# Patient Record
Sex: FEMALE | Race: ASIAN | Employment: FULL TIME | URBAN - METROPOLITAN AREA
[De-identification: names, ages, dates, MRNs, and addresses within clinical notes are randomized per-mention and may not be internally consistent; named-entity substitution may affect disease eponyms.]

---

## 2017-01-16 RX ORDER — TRAZODONE HYDROCHLORIDE 50 MG/1
TABLET ORAL
Qty: 90 TABLET | Refills: 0 | Status: SHIPPED | OUTPATIENT
Start: 2017-01-16 | End: 2017-05-01

## 2017-05-01 ENCOUNTER — OFFICE VISIT (OUTPATIENT)
Dept: FAMILY MEDICINE CLINIC | Facility: CLINIC | Age: 37
End: 2017-05-01

## 2017-05-01 VITALS
HEIGHT: 63 IN | RESPIRATION RATE: 16 BRPM | BODY MASS INDEX: 28 KG/M2 | WEIGHT: 158 LBS | TEMPERATURE: 98 F | SYSTOLIC BLOOD PRESSURE: 114 MMHG | HEART RATE: 74 BPM | DIASTOLIC BLOOD PRESSURE: 72 MMHG | OXYGEN SATURATION: 99 %

## 2017-05-01 DIAGNOSIS — F51.01 PRIMARY INSOMNIA: ICD-10-CM

## 2017-05-01 DIAGNOSIS — R63.5 WEIGHT GAIN: ICD-10-CM

## 2017-05-01 DIAGNOSIS — E78.1 HYPERTRIGLYCERIDEMIA: ICD-10-CM

## 2017-05-01 DIAGNOSIS — E55.9 HYPOVITAMINOSIS D: ICD-10-CM

## 2017-05-01 DIAGNOSIS — M54.12 CERVICAL RADICULOPATHY AT C6: ICD-10-CM

## 2017-05-01 DIAGNOSIS — Z00.00 ROUTINE GENERAL MEDICAL EXAMINATION AT A HEALTH CARE FACILITY: Primary | ICD-10-CM

## 2017-05-01 DIAGNOSIS — R14.0 ABDOMINAL BLOATING: ICD-10-CM

## 2017-05-01 DIAGNOSIS — Z00.00 BLOOD TESTS FOR ROUTINE GENERAL PHYSICAL EXAMINATION: ICD-10-CM

## 2017-05-01 PROCEDURE — 99395 PREV VISIT EST AGE 18-39: CPT | Performed by: FAMILY MEDICINE

## 2017-05-01 RX ORDER — TRAZODONE HYDROCHLORIDE 50 MG/1
TABLET ORAL
Qty: 90 TABLET | Refills: 3 | Status: SHIPPED | OUTPATIENT
Start: 2017-05-01 | End: 2018-04-28

## 2017-05-01 NOTE — PROGRESS NOTES
HPI:  Here for a physical.    PAST MEDICAL HISTORY:  Past Medical History   Diagnosis Date   • Insomnia      PAST SURGICAL HISTORY:      Past Surgical History    INJECTION, W/WO CONTRAST, DX/THERAPEUTIC SUBSTANCE, EPIDURAL/SUBARACHNOID; CERVICAL/THORACIC N Resumed it. Noted that she was not sleeping as well when she lowered the dose. Now sleeping better again. EYES: No complaints ofdiplopia or blurred vision. EARS: No complaints of tinnitus or decreased hearing acuity.   CARDIOVASCULAR: No complaints of c EOMI, bilateral sclera anicteric, non-injected. Conjunctiva pink. NOSE: Mucosa appears healthy. OROPHARYNX: Mucosa moist without lesions. Dentition intact and gums appear healthy. NECK: Supple, No lymphadenopathy. No thyromegaly or lesions palpated.   Tay Bhatti recommended. Tetanus, diphtheria, pertussis vaccine: send me her records from immigration. She may be due for a Tdap as she applied her green card in 2006.         Patient understood above plan and agreed to do labs within the next 30 days as well as to m

## 2017-05-01 NOTE — PATIENT INSTRUCTIONS
Fast 8 hours for labs. Water, black coffee, or plain tea only. Any sugar in the system will alter the glucose level and triglyceride level. Send vaccine records to me via my chart or mail or drop them off.   Tetanus vaccine should be done once every 10

## 2017-05-02 RX ORDER — TRAZODONE HYDROCHLORIDE 50 MG/1
TABLET ORAL
Qty: 90 TABLET | Refills: 0 | OUTPATIENT
Start: 2017-05-02

## 2017-05-05 ENCOUNTER — LAB ENCOUNTER (OUTPATIENT)
Dept: LAB | Age: 37
End: 2017-05-05
Attending: FAMILY MEDICINE
Payer: COMMERCIAL

## 2017-05-05 DIAGNOSIS — R14.0 ABDOMINAL BLOATING: ICD-10-CM

## 2017-05-05 DIAGNOSIS — Z00.00 BLOOD TESTS FOR ROUTINE GENERAL PHYSICAL EXAMINATION: ICD-10-CM

## 2017-05-05 DIAGNOSIS — E78.1 HYPERTRIGLYCERIDEMIA: ICD-10-CM

## 2017-05-05 DIAGNOSIS — R63.5 WEIGHT GAIN: ICD-10-CM

## 2017-05-05 DIAGNOSIS — E55.9 HYPOVITAMINOSIS D: ICD-10-CM

## 2017-05-05 PROCEDURE — 80061 LIPID PANEL: CPT

## 2017-05-05 PROCEDURE — 36415 COLL VENOUS BLD VENIPUNCTURE: CPT

## 2017-05-05 PROCEDURE — 80053 COMPREHEN METABOLIC PANEL: CPT

## 2017-05-05 PROCEDURE — 84443 ASSAY THYROID STIM HORMONE: CPT

## 2017-05-05 PROCEDURE — 83516 IMMUNOASSAY NONANTIBODY: CPT

## 2017-05-05 PROCEDURE — 82306 VITAMIN D 25 HYDROXY: CPT

## 2017-05-05 PROCEDURE — 83036 HEMOGLOBIN GLYCOSYLATED A1C: CPT

## 2017-05-05 PROCEDURE — 85025 COMPLETE CBC W/AUTO DIFF WBC: CPT

## 2017-05-08 ENCOUNTER — TELEPHONE (OUTPATIENT)
Dept: FAMILY MEDICINE CLINIC | Facility: CLINIC | Age: 37
End: 2017-05-08

## 2017-05-08 DIAGNOSIS — R14.0 BLOATING: Primary | ICD-10-CM

## 2017-05-08 DIAGNOSIS — R63.5 WEIGHT GAIN: ICD-10-CM

## 2017-05-08 DIAGNOSIS — E78.1 HYPERTRIGLYCERIDEMIA: ICD-10-CM

## 2017-05-08 DIAGNOSIS — E55.9 VITAMIN D DEFICIENCY: Primary | ICD-10-CM

## 2017-05-08 RX ORDER — FENOFIBRATE 54 MG/1
54 TABLET ORAL DAILY
Qty: 30 TABLET | Refills: 11 | Status: SHIPPED | OUTPATIENT
Start: 2017-05-08 | End: 2017-06-23 | Stop reason: ALTCHOICE

## 2017-05-08 RX ORDER — ERGOCALCIFEROL 1.25 MG/1
50000 CAPSULE ORAL
Qty: 4 CAPSULE | Refills: 3 | Status: SHIPPED | OUTPATIENT
Start: 2017-05-08 | End: 2017-06-23 | Stop reason: SINTOL

## 2017-05-08 NOTE — TELEPHONE ENCOUNTER
Spoke to pt to inform her ok to see GI at this time for bloating/wt gain. Referral for Dr. Kim Parkinson placed.

## 2017-05-16 ENCOUNTER — OFFICE VISIT (OUTPATIENT)
Dept: PHYSICAL THERAPY | Age: 37
End: 2017-05-16
Attending: FAMILY MEDICINE
Payer: COMMERCIAL

## 2017-05-16 ENCOUNTER — APPOINTMENT (OUTPATIENT)
Dept: PHYSICAL THERAPY | Age: 37
End: 2017-05-16
Attending: FAMILY MEDICINE
Payer: COMMERCIAL

## 2017-05-16 DIAGNOSIS — M54.12 CERVICAL RADICULOPATHY AT C6: Primary | ICD-10-CM

## 2017-05-16 PROCEDURE — 97162 PT EVAL MOD COMPLEX 30 MIN: CPT | Performed by: PHYSICAL THERAPIST

## 2017-05-16 NOTE — PROGRESS NOTES
SPINE EVALUATION:   Referring Physician: Dr. Nessa Vega  Diagnosis: Cervical radiculopathy at C6 (M54.12)     Date of Service: 5/16/2017     PATIENT SUMMARY   [place]an [place] is a 42year old y/o female who presents to therapy today with complaints of neck, decreased cervical facet accessory motion, impaired posture. These impairments are leading to functional limitations including; listed above. Urszula would benefit from skilled Physical Therapy to address the above impairments to restore PLOF.      P will report decreased frequency of headaches to <2x/week (8 visits)  · Pt will demonstrate improved cervical intrinsic strength to 5/5 to allow improved cervical stabilization with ADL such as overhead reaching (8 visits)  · Pt will improve postural streng

## 2017-05-17 ENCOUNTER — OFFICE VISIT (OUTPATIENT)
Dept: PHYSICAL THERAPY | Age: 37
End: 2017-05-17
Attending: FAMILY MEDICINE
Payer: COMMERCIAL

## 2017-05-17 PROCEDURE — 97140 MANUAL THERAPY 1/> REGIONS: CPT | Performed by: PHYSICAL THERAPIST

## 2017-05-17 PROCEDURE — 97110 THERAPEUTIC EXERCISES: CPT | Performed by: PHYSICAL THERAPIST

## 2017-05-17 NOTE — PROGRESS NOTES
Dx: Cervical radiculopathy at C6 (M54.12)             Authorized # of Visits:  8         Next MD visit: none scheduled  Fall Risk: standard         Precautions: n/a             Subjective: Patient returns for second visit and is reporting improvement in he pain with lifting to put box on shelf (8 visits)  · Pt will be independent and compliant with comprehensive HEP to maintain progress achieved in PT (8 visits)    Plan: Monitor response to updated HEP. Continue mobility progression.  Utilize manipulation as

## 2017-05-23 ENCOUNTER — OFFICE VISIT (OUTPATIENT)
Dept: PHYSICAL THERAPY | Age: 37
End: 2017-05-23
Attending: FAMILY MEDICINE
Payer: COMMERCIAL

## 2017-05-23 PROCEDURE — 97110 THERAPEUTIC EXERCISES: CPT | Performed by: PHYSICAL THERAPIST

## 2017-05-23 PROCEDURE — 97140 MANUAL THERAPY 1/> REGIONS: CPT | Performed by: PHYSICAL THERAPIST

## 2017-05-23 NOTE — PROGRESS NOTES
Dx: Cervical radiculopathy at C6 (M54.12)             Authorized # of Visits:  8         Next MD visit: none scheduled  Fall Risk: standard         Precautions: n/a             Subjective: Patient is reporting improved symptoms today.  Less burning pain thr decreased pain with lifting to put box on shelf (8 visits)  · Pt will be independent and compliant with comprehensive HEP to maintain progress achieved in PT (8 visits)    Plan: Update HEP at next session if no setbacks today.  Continue focus on flexibility

## 2017-05-24 ENCOUNTER — APPOINTMENT (OUTPATIENT)
Dept: PHYSICAL THERAPY | Age: 37
End: 2017-05-24
Payer: COMMERCIAL

## 2017-05-26 ENCOUNTER — APPOINTMENT (OUTPATIENT)
Dept: PHYSICAL THERAPY | Age: 37
End: 2017-05-26
Attending: FAMILY MEDICINE
Payer: COMMERCIAL

## 2017-05-31 ENCOUNTER — APPOINTMENT (OUTPATIENT)
Dept: LAB | Age: 37
End: 2017-05-31
Attending: INTERNAL MEDICINE
Payer: COMMERCIAL

## 2017-05-31 DIAGNOSIS — R14.0 ABDOMINAL DISTENTION: ICD-10-CM

## 2017-05-31 PROCEDURE — 83013 H PYLORI (C-13) BREATH: CPT

## 2017-06-05 ENCOUNTER — APPOINTMENT (OUTPATIENT)
Dept: PHYSICAL THERAPY | Age: 37
End: 2017-06-05
Attending: FAMILY MEDICINE
Payer: COMMERCIAL

## 2017-06-06 ENCOUNTER — OFFICE VISIT (OUTPATIENT)
Dept: PHYSICAL THERAPY | Age: 37
End: 2017-06-06
Attending: FAMILY MEDICINE
Payer: COMMERCIAL

## 2017-06-06 PROCEDURE — 97140 MANUAL THERAPY 1/> REGIONS: CPT | Performed by: PHYSICAL THERAPIST

## 2017-06-06 PROCEDURE — 97110 THERAPEUTIC EXERCISES: CPT | Performed by: PHYSICAL THERAPIST

## 2017-06-06 NOTE — PROGRESS NOTES
Dx: Cervical radiculopathy at C6 (M54.12)             Authorized # of Visits:  8         Next MD visit: none scheduled  Fall Risk: standard         Precautions: n/a             Subjective: Patient reports no neck pain anymore.  The burning sensation has diana maintain progress achieved in PT (8 visits)    Plan: Monitor response to progression of exercises. Continue as tolerated. Date: 5/17/2017  Tx#: 2/8 Date: 5/23   Tx#: 3/8  Date: 6/6  Tx#: 4/8 Date: Tx#: 5/ Date: Tx#: 6/ Date: Tx#: 7/ Date:    Tx#:

## 2017-06-07 ENCOUNTER — TELEPHONE (OUTPATIENT)
Dept: FAMILY MEDICINE CLINIC | Facility: CLINIC | Age: 37
End: 2017-06-07

## 2017-06-07 DIAGNOSIS — E78.1 HYPERTRIGLYCERIDEMIA: Primary | ICD-10-CM

## 2017-06-07 NOTE — TELEPHONE ENCOUNTER
Patient has been informed of physician's response. She verbalizes understanding and will have repeat lipids in 2 months   Task is done.

## 2017-06-07 NOTE — TELEPHONE ENCOUNTER
Spoke to patient, has been having severe dry mouth, metallic taste, and total lack of taste to foods - feels like eating wood chips when tries to eat anything and is now averse to eating because it is such an awful experience.   Sees GI doctor who did an H.

## 2017-06-08 ENCOUNTER — OFFICE VISIT (OUTPATIENT)
Dept: PHYSICAL THERAPY | Age: 37
End: 2017-06-08
Attending: FAMILY MEDICINE
Payer: COMMERCIAL

## 2017-06-08 ENCOUNTER — APPOINTMENT (OUTPATIENT)
Dept: LAB | Age: 37
End: 2017-06-08
Attending: INTERNAL MEDICINE
Payer: COMMERCIAL

## 2017-06-08 DIAGNOSIS — R43.2 DYSGEUSIA: ICD-10-CM

## 2017-06-08 PROCEDURE — 97110 THERAPEUTIC EXERCISES: CPT | Performed by: PHYSICAL THERAPIST

## 2017-06-08 PROCEDURE — 86235 NUCLEAR ANTIGEN ANTIBODY: CPT

## 2017-06-08 PROCEDURE — 83655 ASSAY OF LEAD: CPT

## 2017-06-08 PROCEDURE — 97140 MANUAL THERAPY 1/> REGIONS: CPT | Performed by: PHYSICAL THERAPIST

## 2017-06-08 PROCEDURE — 84630 ASSAY OF ZINC: CPT

## 2017-06-08 PROCEDURE — 36415 COLL VENOUS BLD VENIPUNCTURE: CPT

## 2017-06-08 PROCEDURE — 82525 ASSAY OF COPPER: CPT

## 2017-06-08 NOTE — PROGRESS NOTES
Dx: Cervical radiculopathy at C6 (M54.12)             Authorized # of Visits:  8         Next MD visit: none scheduled  Fall Risk: standard         Precautions: n/a             Subjective: Patient reports no pain at this time.  She is very pleased with her Date:   Tx#: 7/ Date:    Tx#: 8/   UBE for x 4 min  X 5 L 1.5  X 5 L 1.5  X 5 L 2      Upslope mobilization right facets 3x15 sec Upslope mobilization right facets 3x15 sec CTJ lateral glide mobilization right to left Gr III-IV 3x30 sec  CTJ lateral glide m

## 2017-06-11 ENCOUNTER — PATIENT MESSAGE (OUTPATIENT)
Dept: FAMILY MEDICINE CLINIC | Facility: CLINIC | Age: 37
End: 2017-06-11

## 2017-06-13 ENCOUNTER — OFFICE VISIT (OUTPATIENT)
Dept: PHYSICAL THERAPY | Age: 37
End: 2017-06-13
Attending: FAMILY MEDICINE
Payer: COMMERCIAL

## 2017-06-13 PROCEDURE — 97110 THERAPEUTIC EXERCISES: CPT | Performed by: PHYSICAL THERAPIST

## 2017-06-13 PROCEDURE — 97140 MANUAL THERAPY 1/> REGIONS: CPT | Performed by: PHYSICAL THERAPIST

## 2017-06-13 NOTE — PROGRESS NOTES
Dx: Cervical radiculopathy at C6 (M54.12)             Authorized # of Visits:  8         Next MD visit: none scheduled  Fall Risk: standard         Precautions: n/a             Subjective: Patient reports she was doing well up until Sunday night.  She was t decreased pain with looking up and reaching overhead (8 visits)- met  · Pt will report decreased frequency of headaches to <2x/week (8 visits)- met  · Pt will demonstrate improved cervical intrinsic strength to 5/5 to allow improved cervical stabilization x15     - - TRX W step in x 10 x15 x15     FR OH shldr flex with chin tuck 5 sec hold x 15 x15  x20 x20 3 lb ball -     - Standing W scap retraction 2x10  RTB Standing horizontal abd 2x15 GTB 2x15 GTB Sport core mini-squat hor abd 2x10     - Supine chin tu

## 2017-06-15 ENCOUNTER — OFFICE VISIT (OUTPATIENT)
Dept: PHYSICAL THERAPY | Age: 37
End: 2017-06-15
Attending: FAMILY MEDICINE
Payer: COMMERCIAL

## 2017-06-15 PROCEDURE — 97140 MANUAL THERAPY 1/> REGIONS: CPT | Performed by: PHYSICAL THERAPIST

## 2017-06-15 PROCEDURE — 97110 THERAPEUTIC EXERCISES: CPT | Performed by: PHYSICAL THERAPIST

## 2017-06-15 NOTE — PROGRESS NOTES
Dx: Cervical radiculopathy at C6 (M54.12)             Authorized # of Visits:  8         Next MD visit: none scheduled  Fall Risk: standard         Precautions: n/a             Subjective: Patient returns today reporting less shoulder discomfort but she is additional 1-2 visits with anticipation of discharge. Date: 5/17/2017  Tx#: 2/8 Date: 5/23   Tx#: 3/8  Date: 6/6  Tx#: 4/8 Date: 6/8   Tx#: 5/ Date: 6/13  Tx#: 6/ Date: 6/15  Tx#: 7/ Date:    Tx#: 8/   UBE for x 4 min  X 5 L 1.5  X 5 L 1.5  X 5 L 2 3 mi each - Doorway pec stretch 2x30 sec  Doorway pec stretch 2x30 sec     Seated CTJ TJM x 1 CTJ TJM X 1   Mid thoracic seat TJM x 1 - - Wall pec/biceps stretch 3x15 sec  Wall pec/biceps stretch 3x15 sec    HEP review  Wall push-ups 2x10 2x10  2x15 2x15    Ski

## 2017-06-21 ENCOUNTER — OFFICE VISIT (OUTPATIENT)
Dept: PHYSICAL THERAPY | Age: 37
End: 2017-06-21
Attending: FAMILY MEDICINE
Payer: COMMERCIAL

## 2017-06-21 PROCEDURE — 97140 MANUAL THERAPY 1/> REGIONS: CPT | Performed by: PHYSICAL THERAPIST

## 2017-06-21 PROCEDURE — 97110 THERAPEUTIC EXERCISES: CPT | Performed by: PHYSICAL THERAPIST

## 2017-06-21 NOTE — PROGRESS NOTES
Progress Summary    Pt has attended 8, cancelled 0, and no shown 0 visits in Physical Therapy. Dx: Cervical radiculopathy at C6 (M54.12)             Subjective:  Patient reports she has made gains at this time since beginning therapy.  She states early o Special tests:   Alar Ligament Testing: R -, L -  Flexion-Rotation Test: -  ULTT: negative all three  Cervical Spine Quadrant Testing: + on right  Spurling's Test: -    Goals:  Progressing  ·   · Pt will improve cervical AROM extension  by 15 degrees t and has agreed to actively participate in planning and for this course of care. Thank you for your referral. If you have any questions, please contact me at Dept: [phone].     Sincerely,  Electronically signed by therapist: Meaghan Mancilla PT FR OH shldr flex with chin tuck 5 sec hold x 15 x15  x20 x20 3 lb ball - - FR OH shldr flex with chin tuck 5 sec hold x 15   - Standing W scap retraction 2x10  RTB Standing horizontal abd 2x15 GTB 2x15 GTB Sport core mini-squat hor abd 2x10 Sport core mi

## 2017-06-23 ENCOUNTER — APPOINTMENT (OUTPATIENT)
Dept: LAB | Age: 37
End: 2017-06-23
Attending: FAMILY MEDICINE
Payer: COMMERCIAL

## 2017-06-23 ENCOUNTER — OFFICE VISIT (OUTPATIENT)
Dept: FAMILY MEDICINE CLINIC | Facility: CLINIC | Age: 37
End: 2017-06-23

## 2017-06-23 VITALS
BODY MASS INDEX: 27 KG/M2 | TEMPERATURE: 98 F | DIASTOLIC BLOOD PRESSURE: 80 MMHG | WEIGHT: 152 LBS | RESPIRATION RATE: 20 BRPM | SYSTOLIC BLOOD PRESSURE: 120 MMHG | OXYGEN SATURATION: 98 % | HEART RATE: 90 BPM

## 2017-06-23 DIAGNOSIS — K11.7 XEROSTOMIA: Primary | ICD-10-CM

## 2017-06-23 DIAGNOSIS — N92.6 IRREGULAR MENSES: ICD-10-CM

## 2017-06-23 DIAGNOSIS — E55.9 HYPOVITAMINOSIS D: ICD-10-CM

## 2017-06-23 DIAGNOSIS — R43.8: ICD-10-CM

## 2017-06-23 DIAGNOSIS — R14.0 ABDOMINAL BLOATING: ICD-10-CM

## 2017-06-23 DIAGNOSIS — E78.1 HYPERTRIGLYCERIDEMIA: ICD-10-CM

## 2017-06-23 PROCEDURE — 99214 OFFICE O/P EST MOD 30 MIN: CPT | Performed by: FAMILY MEDICINE

## 2017-06-23 NOTE — PROGRESS NOTES
Saw her in May for a physical.  Triglycerides are found to be elevated and vitamin D level was 10. She was placed on fenofibrate for the triglycerides and vitamin D 50,000 international units weekly.   She is developed a dry mouth with decreased taste sens N/A 5/2/2016    Comment Procedure: CERVICAL EPIDURAL;  Surgeon: Letitia Vazquez MD;  Location: [facility]     MEDICATIONS:    Current Outpatient Prescriptions:  TraZODone HCl 50 MG Oral Tab TAKE 1 TABLET BY MOUTH AT BEDTIME Disp: 90 tab

## 2017-06-25 ENCOUNTER — TELEPHONE (OUTPATIENT)
Dept: FAMILY MEDICINE CLINIC | Facility: CLINIC | Age: 37
End: 2017-06-25

## 2017-06-25 DIAGNOSIS — N92.1 MENORRHAGIA WITH IRREGULAR CYCLE: Primary | ICD-10-CM

## 2017-06-27 ENCOUNTER — HOSPITAL ENCOUNTER (OUTPATIENT)
Dept: ULTRASOUND IMAGING | Age: 37
Discharge: HOME | End: 2017-06-27
Attending: FAMILY MEDICINE
Payer: COMMERCIAL

## 2017-06-27 DIAGNOSIS — N92.6 IRREGULAR MENSES: ICD-10-CM

## 2017-06-27 PROCEDURE — 76856 US EXAM PELVIC COMPLETE: CPT | Performed by: FAMILY MEDICINE

## 2017-06-27 PROCEDURE — 76830 TRANSVAGINAL US NON-OB: CPT | Performed by: FAMILY MEDICINE

## 2017-06-29 ENCOUNTER — OFFICE VISIT (OUTPATIENT)
Dept: PHYSICAL THERAPY | Age: 37
End: 2017-06-29
Attending: FAMILY MEDICINE
Payer: COMMERCIAL

## 2017-06-29 PROCEDURE — 97110 THERAPEUTIC EXERCISES: CPT | Performed by: PHYSICAL THERAPIST

## 2017-06-29 PROCEDURE — 97112 NEUROMUSCULAR REEDUCATION: CPT | Performed by: PHYSICAL THERAPIST

## 2017-06-29 PROCEDURE — 97140 MANUAL THERAPY 1/> REGIONS: CPT | Performed by: PHYSICAL THERAPIST

## 2017-06-29 NOTE — PROGRESS NOTES
Dx: Cervical radiculopathy at C6 (M54.12)             Subjective:  Patient returns reporting ongoing radicular symptoms in to her hand. She states they are through the 3rd and 4th digits and are constant and changing.  She has continued to focus on her po Continue at 1x a week at this time. Progress as tolerated.      Date: 5/17/2017  Tx#: 2/8 Date: 5/23   Tx#: 3/8  Date: 6/6  Tx#: 4/8 Date: 6/8   Tx#: 5/ Date: 6/13  Tx#: 6/ Date: 6/15  Tx#: 7/ Date: 6/21  Tx#: 8/ Date: 6/29  Tx#: 9/   UBE for x 4 min  X 5 L stretch 2x30 sec    FR OH shldr flex with chin tuck 5 sec hold x 15 x15  x20 x20 3 lb ball - - FR OH shldr flex with chin tuck 5 sec hold x 15 Posture education   - Standing W scap retraction 2x10  RTB Standing horizontal abd 2x15 GTB 2x15 GTB Sport core m

## 2017-06-30 ENCOUNTER — NURSE ONLY (OUTPATIENT)
Dept: ELECTROPHYSIOLOGY | Facility: HOSPITAL | Age: 37
End: 2017-06-30
Attending: FAMILY MEDICINE
Payer: COMMERCIAL

## 2017-06-30 DIAGNOSIS — R43.8: ICD-10-CM

## 2017-06-30 PROCEDURE — 95819 EEG AWAKE AND ASLEEP: CPT | Performed by: OTHER

## 2017-07-03 NOTE — PROCEDURES
ELECTROENCEPHALOGRAM REPORT      Patient Name: Ruben Griffith  Chart ID: [id number]  Ordering Physician:   Dr Kirby Sevilla                   Date of Test: 6/30/2017  Patient Diagnosis: Primary sweet taste disorder    DX EVALUATION FOR SEIZURES. 42YEAR OLD FEMAL

## 2017-07-05 ENCOUNTER — OFFICE VISIT (OUTPATIENT)
Dept: PHYSICAL THERAPY | Age: 37
End: 2017-07-05
Attending: FAMILY MEDICINE
Payer: COMMERCIAL

## 2017-07-05 PROCEDURE — 97110 THERAPEUTIC EXERCISES: CPT | Performed by: PHYSICAL THERAPIST

## 2017-07-05 PROCEDURE — 97140 MANUAL THERAPY 1/> REGIONS: CPT | Performed by: PHYSICAL THERAPIST

## 2017-07-06 ENCOUNTER — APPOINTMENT (OUTPATIENT)
Dept: PHYSICAL THERAPY | Age: 37
End: 2017-07-06
Payer: COMMERCIAL

## 2017-07-06 NOTE — PROGRESS NOTES
Dx: Cervical radiculopathy at C6 (M54.12)             Subjective:  Patient reports she has been more focused on not tensing up and assuming relaxed posture. Her symptoms have significantly decreased.  She no longer has radicular hand symptoms and her neck in PT (8 visits)- progressing        Plan: Discharge to independent [facility] at next visit if no setbacks.      Date: 5/17/2017  Tx#: 2/8 Date: 5/23   Tx#: 3/8  Date: 6/6  Tx#: 4/8 Date: 6/8   Tx#: 5/ Date: 6/13  Tx#: 6/ Date: 6/15  Tx#: 7/ Date: 6/21  Tx#: 8/ D Red sport cord 2x20  shldr ext    - - TRX W pec stretch 5x10 sec  10 x 5 sec  x15 x15 - Doorway pec stretch 3x15 sec  2x30 sec    - - TRX W step in x 10 x15 x15 x15 - Child's pose stretch 2x30 sec  2x30 sec    FR OH shldr flex with chin tuck 5 sec hold x 1

## 2017-07-10 ENCOUNTER — OFFICE VISIT (OUTPATIENT)
Dept: PHYSICAL THERAPY | Age: 37
End: 2017-07-10
Attending: FAMILY MEDICINE
Payer: COMMERCIAL

## 2017-07-10 PROCEDURE — 97110 THERAPEUTIC EXERCISES: CPT | Performed by: PHYSICAL THERAPIST

## 2017-07-10 PROCEDURE — 97140 MANUAL THERAPY 1/> REGIONS: CPT | Performed by: PHYSICAL THERAPIST

## 2017-07-10 NOTE — PROGRESS NOTES
Dx: Cervical radiculopathy at C6 (M54.12)             Subjective:  Patient reports that her low back pain is much better. She reports her radicular pain was not bad until today when she was at work.  Her symptoms are located in the lateral arm and in to t posturing and decreased pain with looking up and reaching overhead (8 visits)- met  · Pt will report decreased frequency of headaches to <2x/week (8 visits)- met  · Pt will demonstrate improved cervical intrinsic strength to 5/5 to allow improved cervical min X 5 min X 8 min STM to paraspinals   Trigger point release x 90 sec 2x90 sec mid trap/rhomboid right x90 sec mid trap/rhomboid right Sidelying STM to right UT/LS/ midtrap   x 6 min  IASTM to supraspinatus x 4 min    STM x 4 min to pectoralis  Suboccipi thoracic x 1 - - Thai ball Ts and Ys x 20 each with cervical retraction   HEP review  Wall push-ups 2x10 2x10  2x15 2x15 Wall push-ups 2x10 x20 x20  x20       Charges: M x 1, TE x 2 Total Timed Treatment: 38 min     Total Treatment Time: 38 min

## 2017-07-11 ENCOUNTER — APPOINTMENT (OUTPATIENT)
Dept: PHYSICAL THERAPY | Age: 37
End: 2017-07-11
Payer: COMMERCIAL

## 2017-07-12 ENCOUNTER — PATIENT MESSAGE (OUTPATIENT)
Dept: FAMILY MEDICINE CLINIC | Facility: CLINIC | Age: 37
End: 2017-07-12

## 2017-07-12 NOTE — TELEPHONE ENCOUNTER
From: Orlando Boyle  To: Meño De Leon MD  Sent: 7/12/2017 7:22 AM CDT  Subject: Test Results Question    Dear Dr. Jf Bryant had mentioned that you could consider prescribing 3 months of birth control pills in the Ultrasound tests results.  Please

## 2017-07-12 NOTE — TELEPHONE ENCOUNTER
Please let patient know Dr Amie Jacobsen is out of the office until Monday. I do not see a birth control pill mentioned in his office note. Also do not yet see the results of the EEG.

## 2017-07-12 NOTE — TELEPHONE ENCOUNTER
Dr. Flor Francis, please advise on birth control you want sent, and review EEG under 6/10/17 nurse visit in chart review. Thanks!

## 2017-07-13 NOTE — TELEPHONE ENCOUNTER
Reviewed last visit and ultrasound results/comments. Ok to start Junel FE 24 to start on the first day of her next menses. 1 pack, 2 refills. Follow up appt with RC in 3 months. I see the EEG req under procedures, but I don't see the results yet.

## 2017-07-14 RX ORDER — NORETHINDRONE ACETATE AND ETHINYL ESTRADIOL AND FERROUS FUMARATE 1MG-20(24)
1 KIT ORAL DAILY
Qty: 28 TABLET | Refills: 2 | Status: SHIPPED | OUTPATIENT
Start: 2017-07-14 | End: 2017-10-13

## 2017-07-14 NOTE — TELEPHONE ENCOUNTER
Regarding: RE: Test Results Question  Contact: [phone]  ----- Message from Lorena KARELY burch sent at 7/13/2017  7:03 PM CDT -----       ----- Message from Valentin Riley to Jabier Love MD sent at 7/12/2017  2:11 PM -----   Thank you, Compa Keller

## 2017-07-17 ENCOUNTER — TELEPHONE (OUTPATIENT)
Dept: PHYSICAL THERAPY | Age: 37
End: 2017-07-17

## 2017-07-25 ENCOUNTER — APPOINTMENT (OUTPATIENT)
Dept: PHYSICAL THERAPY | Age: 37
End: 2017-07-25
Attending: FAMILY MEDICINE
Payer: COMMERCIAL

## 2017-08-14 ENCOUNTER — OFFICE VISIT (OUTPATIENT)
Dept: PHYSICAL THERAPY | Age: 37
End: 2017-08-14
Attending: FAMILY MEDICINE
Payer: COMMERCIAL

## 2017-08-14 PROCEDURE — 97140 MANUAL THERAPY 1/> REGIONS: CPT | Performed by: PHYSICAL THERAPIST

## 2017-08-14 PROCEDURE — 97110 THERAPEUTIC EXERCISES: CPT | Performed by: PHYSICAL THERAPIST

## 2017-08-14 NOTE — PROGRESS NOTES
Discharge Summary    Pt has attended 12, cancelled 2, and no shown 0 visits in Physical Therapy. Dx: Cervical radiculopathy at C6 (M54.12)             Subjective: Patient returns to therapy following 1 month hiatus.  She has been attempting self-manageme as promote upright posturing and decreased pain with looking up and reaching overhead (8 visits)- met  · Pt will report decreased frequency of headaches to <2x/week (8 visits)- met  · Pt will demonstrate improved cervical intrinsic strength to 5/5 to allow down 2x15 BTBB Red sport cord 2x20  TRX rows 2x15 3x10 -    FR pec stretch 3x30 sec - Red sport cord 2x20  shldr ext  2x20 -   x15 - Doorway pec stretch 3x15 sec  2x30 sec  2x30 sec  2x30 sec   x15 - Child's pose stretch 2x30 sec  2x30 sec  - -   - FR OH s

## 2017-08-15 ENCOUNTER — OFFICE VISIT (OUTPATIENT)
Dept: FAMILY MEDICINE CLINIC | Facility: CLINIC | Age: 37
End: 2017-08-15

## 2017-08-15 VITALS
TEMPERATURE: 99 F | WEIGHT: 152 LBS | BODY MASS INDEX: 26.93 KG/M2 | RESPIRATION RATE: 18 BRPM | SYSTOLIC BLOOD PRESSURE: 120 MMHG | HEART RATE: 90 BPM | HEIGHT: 63 IN | OXYGEN SATURATION: 97 % | DIASTOLIC BLOOD PRESSURE: 78 MMHG

## 2017-08-15 DIAGNOSIS — M54.50 ACUTE MIDLINE LOW BACK PAIN WITHOUT SCIATICA: Primary | ICD-10-CM

## 2017-08-15 DIAGNOSIS — M72.2 PLANTAR FASCIITIS: ICD-10-CM

## 2017-08-15 PROCEDURE — 99213 OFFICE O/P EST LOW 20 MIN: CPT | Performed by: FAMILY MEDICINE

## 2017-08-15 RX ORDER — NORETHINDRONE ACETATE AND ETHINYL ESTRADIOL, AND FERROUS FUMARATE 1MG-20(24)
KIT ORAL
COMMUNITY
End: 2018-01-05 | Stop reason: ALTCHOICE

## 2017-08-15 NOTE — PATIENT INSTRUCTIONS
Plantar Fasciitis  Plantar fasciitis is a painful swelling of the plantar fascia. The plantar fascia is a thick, fibrous layer of tissue. It covers the bones on the bottom of your foot. And it supports the foot bones in an arched position.   This can happ · First thing in the morning and before sports, stretch the bottom of your feet. Gently flex your ankle so the toes move toward your knee. · Icing may help control heel pain. Apply an ice pack to the heel for 10-20 minutes as a preventive.  Or ice your tosha

## 2017-08-19 NOTE — PROGRESS NOTES
HPI:    Patient ID: Elo Manuel is a 42year old female. Chronic very mild plantar foot pain. Worse when taking first step. Back Pain   This is a new problem. The current episode started in the past 7 days. The problem occurs constantly.  The pr exhibits normal muscle tone. Skin: She is not diaphoretic. Vitals reviewed. ASSESSMENT/PLAN:   Acute midline low back pain without sciatica  (primary encounter diagnosis)  Plantar fasciitis    1.  Acute midline low back pain without sciatica

## 2017-10-13 RX ORDER — NORETHINDRONE ACETATE AND ETHINYL ESTRADIOL 1MG-20(24)
1 KIT ORAL DAILY
Qty: 28 TABLET | Refills: 0 | Status: SHIPPED | OUTPATIENT
Start: 2017-10-13 | End: 2017-11-10

## 2017-12-28 ENCOUNTER — APPOINTMENT (OUTPATIENT)
Dept: LAB | Age: 37
End: 2017-12-28
Attending: FAMILY MEDICINE
Payer: COMMERCIAL

## 2017-12-28 DIAGNOSIS — E78.1 HYPERTRIGLYCERIDEMIA: ICD-10-CM

## 2017-12-28 DIAGNOSIS — E55.9 VITAMIN D DEFICIENCY: ICD-10-CM

## 2017-12-28 LAB
25-HYDROXYVITAMIN D (TOTAL): 21.7 NG/ML (ref 30–100)
CHOLEST SMN-MCNC: 205 MG/DL (ref ?–200)
HDLC SERPL-MCNC: 37 MG/DL (ref 45–?)
HDLC SERPL: 5.54 {RATIO} (ref ?–4.44)
LDLC SERPL CALC-MCNC: 131 MG/DL (ref ?–130)
NONHDLC SERPL-MCNC: 168 MG/DL (ref ?–130)
TRIGL SERPL-MCNC: 187 MG/DL (ref ?–150)
VLDLC SERPL CALC-MCNC: 37 MG/DL (ref 5–40)

## 2017-12-28 PROCEDURE — 36415 COLL VENOUS BLD VENIPUNCTURE: CPT

## 2017-12-28 PROCEDURE — 80061 LIPID PANEL: CPT

## 2017-12-28 PROCEDURE — 82306 VITAMIN D 25 HYDROXY: CPT

## 2018-01-05 ENCOUNTER — OFFICE VISIT (OUTPATIENT)
Dept: FAMILY MEDICINE CLINIC | Facility: CLINIC | Age: 38
End: 2018-01-05

## 2018-01-05 VITALS
HEIGHT: 63 IN | SYSTOLIC BLOOD PRESSURE: 118 MMHG | DIASTOLIC BLOOD PRESSURE: 74 MMHG | TEMPERATURE: 98 F | OXYGEN SATURATION: 98 % | WEIGHT: 157 LBS | RESPIRATION RATE: 16 BRPM | BODY MASS INDEX: 27.82 KG/M2 | HEART RATE: 81 BPM

## 2018-01-05 DIAGNOSIS — E55.9 HYPOVITAMINOSIS D: Primary | ICD-10-CM

## 2018-01-05 PROCEDURE — 99213 OFFICE O/P EST LOW 20 MIN: CPT | Performed by: FAMILY MEDICINE

## 2018-01-05 RX ORDER — NORETHINDRONE ACETATE AND ETHINYL ESTRADIOL, AND FERROUS FUMARATE 1MG-20(24)
1 KIT ORAL DAILY
Qty: 28 CAPSULE | Refills: 12 | Status: SHIPPED | OUTPATIENT
Start: 2018-01-05 | End: 2018-01-17

## 2018-01-05 NOTE — PROGRESS NOTES
Here in follow-up. Had labs done last week. Triglycerides down from 312 in May to a level of 170 in December. Vitamin D level has risen from 10 to a level of 21. She is not taking a supplement at this moment.     Last year we had discussed issues with yakelin tablet by mouth daily. Disp: 30 tablet Rfl: 0   Norethin Ace-Eth Estrad-FE 1-20 MG-MCG(24) Oral Cap Take 1 capsule by mouth daily.  Disp: 28 capsule Rfl: 12   TraZODone HCl 50 MG Oral Tab TAKE 1 TABLET BY MOUTH AT BEDTIME Disp: 90 tablet Rfl: 3     ALLERGIE

## 2018-01-17 ENCOUNTER — PATIENT MESSAGE (OUTPATIENT)
Dept: FAMILY MEDICINE CLINIC | Facility: CLINIC | Age: 38
End: 2018-01-17

## 2018-01-17 RX ORDER — NORETHINDRONE ACETATE AND ETHINYL ESTRADIOL 1MG-20(24)
1 KIT ORAL DAILY
Qty: 28 TABLET | Refills: 12 | Status: SHIPPED | OUTPATIENT
Start: 2018-01-17 | End: 2018-02-14

## 2018-01-17 NOTE — TELEPHONE ENCOUNTER
Please advise. Should patient stop Taytulla and start Blisovi 24 Fe today or wait to complete the full cycle of Taytulla before switching to Blisovi 24 Fe? She has taken 10 pills. Today is the 11th day.

## 2018-01-17 NOTE — TELEPHONE ENCOUNTER
From: Pauly Davis  To: Claire Rivera MD  Sent: 1/17/2018 10:20 AM CST  Subject: Prescription Question    Dear Dr. Johns,    The new birth control pills for regulating my hormones are a different brand than the ones I had used the last time.   Previo

## 2018-01-17 NOTE — TELEPHONE ENCOUNTER
See if we can get the brand that she was on. The iron inthe previous brand probably help with the bowel movements.

## 2018-04-06 ENCOUNTER — OFFICE VISIT (OUTPATIENT)
Dept: FAMILY MEDICINE CLINIC | Facility: CLINIC | Age: 38
End: 2018-04-06

## 2018-04-06 VITALS
BODY MASS INDEX: 26.93 KG/M2 | SYSTOLIC BLOOD PRESSURE: 114 MMHG | DIASTOLIC BLOOD PRESSURE: 78 MMHG | HEART RATE: 76 BPM | WEIGHT: 152 LBS | HEIGHT: 63 IN | TEMPERATURE: 99 F | RESPIRATION RATE: 16 BRPM

## 2018-04-06 DIAGNOSIS — R05.9 COUGH: Primary | ICD-10-CM

## 2018-04-06 PROCEDURE — 99213 OFFICE O/P EST LOW 20 MIN: CPT | Performed by: FAMILY MEDICINE

## 2018-04-06 RX ORDER — FLUTICASONE PROPIONATE 50 MCG
2 SPRAY, SUSPENSION (ML) NASAL DAILY
Qty: 1 BOTTLE | Refills: 1 | Status: SHIPPED | OUTPATIENT
Start: 2018-04-06 | End: 2018-04-23 | Stop reason: ALTCHOICE

## 2018-04-06 RX ORDER — BENZONATATE 200 MG/1
200 CAPSULE ORAL EVERY 8 HOURS PRN
Qty: 30 CAPSULE | Refills: 0 | Status: SHIPPED | OUTPATIENT
Start: 2018-04-06 | End: 2018-04-23 | Stop reason: ALTCHOICE

## 2018-04-06 RX ORDER — NORETHINDRONE ACETATE AND ETHINYL ESTRADIOL, AND FERROUS FUMARATE 1MG-20(24)
KIT ORAL
COMMUNITY
End: 2018-04-12

## 2018-04-06 NOTE — PROGRESS NOTES
HPI:    Patient ID: Linsey Randle is a 34year old female. Cough   This is a chronic problem. Episode onset: 4 weeks ago. The problem has been gradually improving. The problem occurs every few minutes (worse during the night).  The cough is non-producti discharge. Left eye exhibits no discharge. Neck: Normal range of motion. Neck supple. No thyromegaly present. Cardiovascular: Normal rate, regular rhythm, normal heart sounds and intact distal pulses.     Pulmonary/Chest: Effort normal and breath sounds

## 2018-04-12 ENCOUNTER — TELEPHONE (OUTPATIENT)
Dept: FAMILY MEDICINE CLINIC | Facility: CLINIC | Age: 38
End: 2018-04-12

## 2018-04-12 ENCOUNTER — OFFICE VISIT (OUTPATIENT)
Dept: FAMILY MEDICINE CLINIC | Facility: CLINIC | Age: 38
End: 2018-04-12

## 2018-04-12 VITALS
TEMPERATURE: 98 F | HEIGHT: 63 IN | HEART RATE: 77 BPM | WEIGHT: 158 LBS | RESPIRATION RATE: 16 BRPM | SYSTOLIC BLOOD PRESSURE: 122 MMHG | BODY MASS INDEX: 28 KG/M2 | DIASTOLIC BLOOD PRESSURE: 80 MMHG | OXYGEN SATURATION: 98 %

## 2018-04-12 DIAGNOSIS — R05.9 COUGH: Primary | ICD-10-CM

## 2018-04-12 PROCEDURE — 99213 OFFICE O/P EST LOW 20 MIN: CPT | Performed by: PHYSICIAN ASSISTANT

## 2018-04-12 RX ORDER — NORETHINDRONE ACETATE AND ETHINYL ESTRADIOL 1MG-20(24)
KIT ORAL
COMMUNITY
Start: 2018-04-09 | End: 2018-04-23 | Stop reason: ALTCHOICE

## 2018-04-12 RX ORDER — PREDNISONE 20 MG/1
TABLET ORAL
Qty: 10 TABLET | Refills: 0 | Status: SHIPPED | OUTPATIENT
Start: 2018-04-12 | End: 2018-04-23 | Stop reason: ALTCHOICE

## 2018-04-12 NOTE — TELEPHONE ENCOUNTER
Called , he states pt has a sore throat now and cough has worsen and some wheezing noticed. Pt is on the benzonatate and Fluticasone. He states pt has gotten Prednisone in the past for this cough and \"it has helped\".  Scheduled pt for today with BEN

## 2018-04-12 NOTE — PROGRESS NOTES
HPI:   Puja Maier is a 34year old female who presents for cough for  1  months. Pt seen 4/6/18 by Dr Anna Stuart. Pt was prescribed tessalon perles and fluticasone nasal spray and she is using both regularly.  Pt was advised to follow up in 1 week if cough p (36.6 °C) (Oral)   Resp 16   Ht 63\"   Wt 158 lb   SpO2 98%   BMI 27.99 kg/m²   GENERAL: well developed and in no apparent distress  SKIN: warm & dry, no rash  EYES:PERRLA, conjunctiva are clear  HEENT: atraumatic, normocephalic, TMs pearly gray without er

## 2018-04-23 ENCOUNTER — HOSPITAL ENCOUNTER (OUTPATIENT)
Dept: GENERAL RADIOLOGY | Age: 38
Discharge: HOME | End: 2018-04-23
Attending: FAMILY MEDICINE
Payer: COMMERCIAL

## 2018-04-23 ENCOUNTER — OFFICE VISIT (OUTPATIENT)
Dept: FAMILY MEDICINE CLINIC | Facility: CLINIC | Age: 38
End: 2018-04-23

## 2018-04-23 ENCOUNTER — HOSPITAL ENCOUNTER (OUTPATIENT)
Dept: GENERAL RADIOLOGY | Age: 38
Discharge: HOME | End: 2018-04-23
Attending: PHYSICIAN ASSISTANT
Payer: COMMERCIAL

## 2018-04-23 VITALS
OXYGEN SATURATION: 99 % | BODY MASS INDEX: 27.49 KG/M2 | HEART RATE: 83 BPM | HEIGHT: 63 IN | RESPIRATION RATE: 16 BRPM | SYSTOLIC BLOOD PRESSURE: 120 MMHG | WEIGHT: 155.13 LBS | DIASTOLIC BLOOD PRESSURE: 86 MMHG | TEMPERATURE: 98 F

## 2018-04-23 DIAGNOSIS — M48.02 FORAMINAL STENOSIS OF CERVICAL REGION: Primary | ICD-10-CM

## 2018-04-23 DIAGNOSIS — R05.9 COUGH: ICD-10-CM

## 2018-04-23 DIAGNOSIS — R09.89 RALES: ICD-10-CM

## 2018-04-23 DIAGNOSIS — M48.02 FORAMINAL STENOSIS OF CERVICAL REGION: ICD-10-CM

## 2018-04-23 PROCEDURE — 72040 X-RAY EXAM NECK SPINE 2-3 VW: CPT | Performed by: FAMILY MEDICINE

## 2018-04-23 PROCEDURE — 71046 X-RAY EXAM CHEST 2 VIEWS: CPT | Performed by: PHYSICIAN ASSISTANT

## 2018-04-23 PROCEDURE — 99213 OFFICE O/P EST LOW 20 MIN: CPT | Performed by: FAMILY MEDICINE

## 2018-04-23 RX ORDER — PREDNISONE 20 MG/1
TABLET ORAL
Qty: 32 TABLET | Refills: 0 | Status: SHIPPED | OUTPATIENT
Start: 2018-04-23 | End: 2018-05-10 | Stop reason: ALTCHOICE

## 2018-04-23 RX ORDER — NORETHINDRONE ACETATE AND ETHINYL ESTRADIOL AND FERROUS FUMARATE 1MG-20(21)
KIT ORAL
COMMUNITY
Start: 2018-04-17 | End: 2018-05-10

## 2018-04-23 NOTE — PROGRESS NOTES
Has been treated by Dr. Cherylene League and then Arlette Sánchez for cough. Tessalon Perles were prescribed first.  Then went through a course of steroids which did help. The cough came back 2 days after stopping the steroids. The cough is nonproductive.   No related fever allergies.   FAMILY HISTORY  Family History   Problem Relation Age of Onset   • Diabetes Father    • Hypertension Father    • Other[other] [OTHER] Father    • Heart Disorder Father 70   • Diabetes Mother        PHYSICAL EXAM:  /86 (BP Location: Right

## 2018-04-28 DIAGNOSIS — F51.01 PRIMARY INSOMNIA: ICD-10-CM

## 2018-04-30 RX ORDER — TRAZODONE HYDROCHLORIDE 50 MG/1
TABLET ORAL
Qty: 90 TABLET | Refills: 0 | Status: SHIPPED | OUTPATIENT
Start: 2018-04-30 | End: 2018-05-10

## 2018-05-10 ENCOUNTER — OFFICE VISIT (OUTPATIENT)
Dept: FAMILY MEDICINE CLINIC | Facility: CLINIC | Age: 38
End: 2018-05-10

## 2018-05-10 ENCOUNTER — APPOINTMENT (OUTPATIENT)
Dept: LAB | Age: 38
End: 2018-05-10
Attending: FAMILY MEDICINE
Payer: COMMERCIAL

## 2018-05-10 VITALS
DIASTOLIC BLOOD PRESSURE: 78 MMHG | HEART RATE: 86 BPM | SYSTOLIC BLOOD PRESSURE: 116 MMHG | BODY MASS INDEX: 27.29 KG/M2 | TEMPERATURE: 99 F | RESPIRATION RATE: 16 BRPM | OXYGEN SATURATION: 98 % | WEIGHT: 154 LBS | HEIGHT: 63 IN

## 2018-05-10 DIAGNOSIS — L70.0 ACNE VULGARIS: ICD-10-CM

## 2018-05-10 DIAGNOSIS — F51.01 PRIMARY INSOMNIA: ICD-10-CM

## 2018-05-10 DIAGNOSIS — Z23 NEED FOR DIPHTHERIA-TETANUS-PERTUSSIS (TDAP) VACCINE: ICD-10-CM

## 2018-05-10 DIAGNOSIS — E55.9 HYPOVITAMINOSIS D: ICD-10-CM

## 2018-05-10 DIAGNOSIS — Z00.00 ROUTINE GENERAL MEDICAL EXAMINATION AT A HEALTH CARE FACILITY: Primary | ICD-10-CM

## 2018-05-10 DIAGNOSIS — R00.0 TACHYCARDIA: ICD-10-CM

## 2018-05-10 DIAGNOSIS — M48.02 FORAMINAL STENOSIS OF CERVICAL REGION: ICD-10-CM

## 2018-05-10 DIAGNOSIS — Z12.39 SCREENING FOR BREAST CANCER: ICD-10-CM

## 2018-05-10 DIAGNOSIS — E78.1 HYPERTRIGLYCERIDEMIA: ICD-10-CM

## 2018-05-10 PROCEDURE — 90715 TDAP VACCINE 7 YRS/> IM: CPT | Performed by: FAMILY MEDICINE

## 2018-05-10 PROCEDURE — 88175 CYTOPATH C/V AUTO FLUID REDO: CPT | Performed by: FAMILY MEDICINE

## 2018-05-10 PROCEDURE — 87624 HPV HI-RISK TYP POOLED RSLT: CPT | Performed by: FAMILY MEDICINE

## 2018-05-10 PROCEDURE — 82306 VITAMIN D 25 HYDROXY: CPT

## 2018-05-10 PROCEDURE — 93000 ELECTROCARDIOGRAM COMPLETE: CPT | Performed by: FAMILY MEDICINE

## 2018-05-10 PROCEDURE — 36415 COLL VENOUS BLD VENIPUNCTURE: CPT

## 2018-05-10 PROCEDURE — 90471 IMMUNIZATION ADMIN: CPT | Performed by: FAMILY MEDICINE

## 2018-05-10 PROCEDURE — 99396 PREV VISIT EST AGE 40-64: CPT | Performed by: FAMILY MEDICINE

## 2018-05-10 RX ORDER — NORETHINDRONE ACETATE AND ETHINYL ESTRADIOL AND FERROUS FUMARATE 1MG-20(21)
1 KIT ORAL DAILY
Qty: 3 PACKAGE | Refills: 3 | Status: SHIPPED | OUTPATIENT
Start: 2018-05-10 | End: 2019-01-15 | Stop reason: ALTCHOICE

## 2018-05-10 RX ORDER — CHLORAL HYDRATE 500 MG
1000 CAPSULE ORAL DAILY
COMMUNITY
End: 2019-07-17

## 2018-05-10 RX ORDER — TRAZODONE HYDROCHLORIDE 50 MG/1
TABLET ORAL
Qty: 90 TABLET | Refills: 3 | Status: SHIPPED | OUTPATIENT
Start: 2018-05-10 | End: 2018-11-16

## 2018-05-10 NOTE — PROGRESS NOTES
HPI:  Here for a physical.    PAST MEDICAL HISTORY:  Past Medical History:   Diagnosis Date   • Insomnia      PAST SURGICAL HISTORY:  Past Surgical History:  2/18/2016: Jignesh HAMEED & Meredith Conte NDL/CATH SPI DX/THER PQJ N/A      Comment: Procedure: CERVICAL EPIDU Never Used    Alcohol use Yes    Comment: socially    Drug use: No    Sexual activity: Not on file     Other Topics Concern    Caffeine Concern Yes    Exercise Yes     Social History Narrative   None on file       REVIEW OF SYSTEMS:  GENERAL: Feeling gener tragus are WNL in appearance, External canals patent and without inflammation. Bilateral tympanic membranes pearly white. No effusions noted. Hearing grossly normal.  EYES: PERRLA, EOMI, bilateral sclera anicteric, non-injected. Conjunctiva pink.   NOSE: Mu today.  RHM information discussed: Continue birth control. Additional concerns: Tachycardia on Holter monitor: Check EKG. If negative weight over the weekend. Possibly related to steroid use.   Hypovitaminosis D: Check vitamin D  Cervical neck pain radia

## 2018-05-11 ENCOUNTER — TELEPHONE (OUTPATIENT)
Dept: FAMILY MEDICINE CLINIC | Facility: CLINIC | Age: 38
End: 2018-05-11

## 2018-05-11 DIAGNOSIS — E55.9 VITAMIN D DEFICIENCY: Primary | ICD-10-CM

## 2018-05-11 DIAGNOSIS — E55.9 VITAMIN D DEFICIENCY: ICD-10-CM

## 2018-05-11 RX ORDER — ERGOCALCIFEROL 1.25 MG/1
50000 CAPSULE ORAL WEEKLY
Qty: 12 CAPSULE | Refills: 0 | Status: SHIPPED | OUTPATIENT
Start: 2018-05-11 | End: 2018-08-09

## 2018-05-11 RX ORDER — ERGOCALCIFEROL 1.25 MG/1
CAPSULE ORAL
Qty: 12 CAPSULE | Refills: 0 | OUTPATIENT
Start: 2018-05-11

## 2018-05-11 NOTE — TELEPHONE ENCOUNTER
----- Message from Lucrecia Martinez MD sent at 5/11/2018  7:58 AM CDT -----  Low vitamin D.  Start 50,000 IU vitamin D weekly and repeat level in 12 weeks.

## 2018-05-15 ENCOUNTER — OFFICE VISIT (OUTPATIENT)
Dept: PHYSICAL THERAPY | Age: 38
End: 2018-05-15
Attending: FAMILY MEDICINE
Payer: COMMERCIAL

## 2018-05-15 DIAGNOSIS — M48.02 FORAMINAL STENOSIS OF CERVICAL REGION: ICD-10-CM

## 2018-05-15 PROCEDURE — 97110 THERAPEUTIC EXERCISES: CPT

## 2018-05-15 PROCEDURE — 97161 PT EVAL LOW COMPLEX 20 MIN: CPT

## 2018-05-15 NOTE — PROGRESS NOTES
SPINE EVALUATION:   Referring Physician: Dr. Lucía Crum  Diagnosis: Foraminal stenosis of cervical region (M99.81)     Date of Service: 5/15/2018     PATIENT SUMMARY   Gamaliel Angulo is a 34year old y/o female who presents to therapy today with complaints None  OBJECTIVE:   Observation/Posture: rounded shoulder and fwd head  Neuro Screen: UE dermatomes: WNL, UE myotomes:  WNL    Cervical ROM:   Flexion: 30 deg  Extension: 60 deg  Sidebending: R 30 deg, tight; L 40 deg  Rotation: R 80 deg; L 90 deg    Accesso Potential:good    FOTO: 63/100    Patient/Family/Caregiver was advised of these findings, precautions, and treatment options and has agreed to actively participate in planning and for this course of care.     Thank you for your referral. Please co-sign or s

## 2018-05-18 ENCOUNTER — OFFICE VISIT (OUTPATIENT)
Dept: PHYSICAL THERAPY | Age: 38
End: 2018-05-18
Attending: FAMILY MEDICINE
Payer: COMMERCIAL

## 2018-05-18 ENCOUNTER — HOSPITAL ENCOUNTER (OUTPATIENT)
Dept: MRI IMAGING | Age: 38
Discharge: HOME | End: 2018-05-18
Attending: FAMILY MEDICINE
Payer: COMMERCIAL

## 2018-05-18 DIAGNOSIS — M48.02 FORAMINAL STENOSIS OF CERVICAL REGION: ICD-10-CM

## 2018-05-18 PROBLEM — M50.20 HERNIATED DISC, CERVICAL: Status: ACTIVE | Noted: 2018-05-18

## 2018-05-18 PROCEDURE — 97140 MANUAL THERAPY 1/> REGIONS: CPT

## 2018-05-18 PROCEDURE — 97110 THERAPEUTIC EXERCISES: CPT

## 2018-05-18 PROCEDURE — 72141 MRI NECK SPINE W/O DYE: CPT | Performed by: FAMILY MEDICINE

## 2018-05-18 NOTE — PROGRESS NOTES
Dx: Foraminal stenosis of cervical region (M99.81)         Authorized # of Visits:    PPO        Next MD visit: none scheduled  Fall Risk: standard         Precautions: n/a             Subjective: No new problems.  Reports that she was feeling better with t

## 2018-05-19 DIAGNOSIS — J02.9 ACUTE PHARYNGITIS, UNSPECIFIED ETIOLOGY: ICD-10-CM

## 2018-05-19 DIAGNOSIS — F51.01 PRIMARY INSOMNIA: ICD-10-CM

## 2018-05-19 NOTE — TELEPHONE ENCOUNTER
Trazadone  90 day supply prescribed by SANDI  Benzonatate 200mg  For cough prescribed by YP       called and stated his wife is leaving out of the country on Tuesday for an emergency. He would like to know if we can refill the above  RX's.   He stated

## 2018-05-19 NOTE — TELEPHONE ENCOUNTER
Trazadone  90 day supply prescribed by SANDI  Benzonatate 200mg  For cough prescribed by YP      called and stated his wife is leaving out of the country on Tuesday for an emergency. He would like to know if we can refill the above  RX's.   He stated t

## 2018-05-21 RX ORDER — BENZONATATE 100 MG/1
100 CAPSULE ORAL 3 TIMES DAILY PRN
Qty: 30 CAPSULE | Refills: 0 | Status: SHIPPED | OUTPATIENT
Start: 2018-05-21 | End: 2019-01-15 | Stop reason: ALTCHOICE

## 2018-05-21 RX ORDER — BENZONATATE 200 MG/1
CAPSULE ORAL
Qty: 30 CAPSULE | Refills: 0 | OUTPATIENT
Start: 2018-05-21

## 2018-05-21 RX ORDER — TRAZODONE HYDROCHLORIDE 50 MG/1
TABLET ORAL
Qty: 90 TABLET | Refills: 0 | OUTPATIENT
Start: 2018-05-21

## 2018-05-21 NOTE — TELEPHONE ENCOUNTER
Pt's  called back to check on the status of the prescriptions for the trazodone and the benzonatate, he mentioned he requested this since the 19th on a emergency basis, pt is leaving out of the country tomorrow, pt's  is asking to please give

## 2018-05-21 NOTE — TELEPHONE ENCOUNTER
Benzonate Rx was sent to [address] [place] [address] for approval. Trazodone was sent to Pharmacy on 05/10/2018

## 2018-05-21 NOTE — TELEPHONE ENCOUNTER
called and is checking on RX\"s  He stated he needs them today. Please call him when they are called in.

## 2018-05-22 ENCOUNTER — APPOINTMENT (OUTPATIENT)
Dept: PHYSICAL THERAPY | Age: 38
End: 2018-05-22
Attending: FAMILY MEDICINE
Payer: COMMERCIAL

## 2018-05-25 ENCOUNTER — APPOINTMENT (OUTPATIENT)
Dept: PHYSICAL THERAPY | Age: 38
End: 2018-05-25
Attending: FAMILY MEDICINE
Payer: COMMERCIAL

## 2018-06-01 ENCOUNTER — APPOINTMENT (OUTPATIENT)
Dept: PHYSICAL THERAPY | Age: 38
End: 2018-06-01
Attending: FAMILY MEDICINE
Payer: COMMERCIAL

## 2018-06-05 ENCOUNTER — APPOINTMENT (OUTPATIENT)
Dept: PHYSICAL THERAPY | Age: 38
End: 2018-06-05
Attending: FAMILY MEDICINE
Payer: COMMERCIAL

## 2018-06-08 ENCOUNTER — APPOINTMENT (OUTPATIENT)
Dept: PHYSICAL THERAPY | Age: 38
End: 2018-06-08
Attending: FAMILY MEDICINE
Payer: COMMERCIAL

## 2018-06-12 ENCOUNTER — APPOINTMENT (OUTPATIENT)
Dept: PHYSICAL THERAPY | Age: 38
End: 2018-06-12
Attending: FAMILY MEDICINE
Payer: COMMERCIAL

## 2018-11-08 ENCOUNTER — OFFICE VISIT (OUTPATIENT)
Dept: FAMILY MEDICINE CLINIC | Facility: CLINIC | Age: 38
End: 2018-11-08
Payer: COMMERCIAL

## 2018-11-08 ENCOUNTER — APPOINTMENT (OUTPATIENT)
Dept: LAB | Age: 38
End: 2018-11-08
Attending: INTERNAL MEDICINE
Payer: COMMERCIAL

## 2018-11-08 VITALS
HEIGHT: 63 IN | SYSTOLIC BLOOD PRESSURE: 126 MMHG | RESPIRATION RATE: 18 BRPM | BODY MASS INDEX: 27.64 KG/M2 | HEART RATE: 71 BPM | DIASTOLIC BLOOD PRESSURE: 62 MMHG | TEMPERATURE: 98 F | WEIGHT: 156 LBS | OXYGEN SATURATION: 98 %

## 2018-11-08 DIAGNOSIS — B96.89 ACUTE BACTERIAL SINUSITIS: ICD-10-CM

## 2018-11-08 DIAGNOSIS — J01.90 ACUTE BACTERIAL SINUSITIS: ICD-10-CM

## 2018-11-08 DIAGNOSIS — E55.9 VITAMIN D DEFICIENCY: Primary | ICD-10-CM

## 2018-11-08 DIAGNOSIS — L70.0 ACNE VULGARIS: ICD-10-CM

## 2018-11-08 DIAGNOSIS — L65.9 HAIR THINNING: ICD-10-CM

## 2018-11-08 DIAGNOSIS — L70.9 ADULT ACNE: ICD-10-CM

## 2018-11-08 PROCEDURE — 99214 OFFICE O/P EST MOD 30 MIN: CPT | Performed by: INTERNAL MEDICINE

## 2018-11-08 PROCEDURE — 36415 COLL VENOUS BLD VENIPUNCTURE: CPT | Performed by: INTERNAL MEDICINE

## 2018-11-08 PROCEDURE — 82306 VITAMIN D 25 HYDROXY: CPT | Performed by: INTERNAL MEDICINE

## 2018-11-08 PROCEDURE — 84443 ASSAY THYROID STIM HORMONE: CPT | Performed by: INTERNAL MEDICINE

## 2018-11-08 RX ORDER — CEFDINIR 300 MG/1
300 CAPSULE ORAL 2 TIMES DAILY
Qty: 20 CAPSULE | Refills: 0 | Status: SHIPPED | OUTPATIENT
Start: 2018-11-08 | End: 2019-01-15 | Stop reason: ALTCHOICE

## 2018-11-08 RX ORDER — SPIRONOLACTONE 50 MG/1
50 TABLET, FILM COATED ORAL DAILY
Qty: 90 TABLET | Refills: 0 | Status: SHIPPED | OUTPATIENT
Start: 2018-11-08 | End: 2019-02-12

## 2018-11-13 NOTE — PROGRESS NOTES
HPI:   Mayelin Caldwell is a 34year old female who presents for sinus symptoms for  1  weeks. Patient reports congestion, yellow colored nasal discharge, sinus pain. Also here for symptoms of hair thinning,especially on the right side.  Acne breakouts deanne pain  NEURO: no headaches, no dizziness    EXAM:   /62   Pulse 71   Temp 97.9 °F (36.6 °C) (Oral)   Resp 18   Ht 63\"   Wt 156 lb   LMP 10/19/2018   SpO2 98%   BMI 27.63 kg/m²   GENERAL: well developed and in no apparent distress  SKIN: warm & dry; p

## 2018-11-16 DIAGNOSIS — F51.01 PRIMARY INSOMNIA: ICD-10-CM

## 2018-11-16 RX ORDER — TRAZODONE HYDROCHLORIDE 50 MG/1
TABLET ORAL
Qty: 90 TABLET | Refills: 1 | Status: SHIPPED | OUTPATIENT
Start: 2018-11-16 | End: 2019-05-13

## 2018-11-16 NOTE — TELEPHONE ENCOUNTER
Trazadone    Need new RX sent or called to [facility]  [phone]    Patient is running low  90 day supply.

## 2018-11-16 NOTE — TELEPHONE ENCOUNTER
12 month supply sent on 5/10/18 to [facility] pt now requests refill to go to [facility] with [facility] 5/10  With AIMEE 11/8/18    Please review and advise

## 2019-01-15 ENCOUNTER — OFFICE VISIT (OUTPATIENT)
Dept: FAMILY MEDICINE CLINIC | Facility: CLINIC | Age: 39
End: 2019-01-15
Payer: COMMERCIAL

## 2019-01-15 VITALS
HEIGHT: 63 IN | RESPIRATION RATE: 18 BRPM | TEMPERATURE: 98 F | WEIGHT: 156 LBS | BODY MASS INDEX: 27.64 KG/M2 | HEART RATE: 73 BPM | SYSTOLIC BLOOD PRESSURE: 110 MMHG | OXYGEN SATURATION: 98 % | DIASTOLIC BLOOD PRESSURE: 78 MMHG

## 2019-01-15 DIAGNOSIS — I95.1 ORTHOSTATIC HYPOTENSION: ICD-10-CM

## 2019-01-15 DIAGNOSIS — L30.9 DERMATITIS: Primary | ICD-10-CM

## 2019-01-15 PROCEDURE — 99213 OFFICE O/P EST LOW 20 MIN: CPT | Performed by: FAMILY MEDICINE

## 2019-01-15 NOTE — PROGRESS NOTES
Here with rash behind the ears going on 3 days now. Did color her hair over the weekend but seemed to hair coloring, no change in the brand. She does not wear glasses. No recent sunglasses wearing. No recent 3D moving. The rash itches.   It is felt war processes is redness with fine bumps/scales. Neck one small mobile lymph node in the left anterior cervical chain. Scalp without redness or scaling. No rash noted on the neck or arms. No lymphadenopathy in the axilla or antecubital fossa.     Assessment

## 2019-01-15 NOTE — PATIENT INSTRUCTIONS
Call in one week for stronger steroid if not resolved. Wash hands after application; Understanding Orthostatic Hypotension   Orthostatic hypotension is low blood pressure when you stand up from sitting or lying down.  It can cause symptoms for such as him or her every medicine that you take. This includes over-the-counter supplements, vitamins, and herbs.  Also tell your healthcare provider if you have been sick recently.   You may also have tests such as:  · Blood pressure test. Your healthcare provider color in your stools or vomit  · Diarrhea or vomiting that doesn’t stop  · Inability to eat or drink  · Burning when you urinate  · Foul-smelling urine   Date Last Reviewed: 12/1/2017  © [id number] [employer] [address]  [address],

## 2019-01-16 ENCOUNTER — TELEPHONE (OUTPATIENT)
Dept: FAMILY MEDICINE CLINIC | Facility: CLINIC | Age: 39
End: 2019-01-16

## 2019-01-16 NOTE — TELEPHONE ENCOUNTER
Pt  calling because he is concerned about the bump behind his wife's ear  Pt saw Dr. Scanlon Drilling yesterday. He did not feel the bump  When they got home the pt  saw it and said it is more pronounced.   Pt  wants to know if she still should

## 2019-01-16 NOTE — TELEPHONE ENCOUNTER
states wife leaving for [facility] and wants to know if there a different script besides the cream that you can prescribe for her in case the cream doesn't work and she is out of the country? Please advise.

## 2019-01-16 NOTE — TELEPHONE ENCOUNTER
If the first cream doesn't work in one week, we were going to start betamethasone 0.05% cream daily. I suppose we could rx that for her travels.

## 2019-01-17 ENCOUNTER — OFFICE VISIT (OUTPATIENT)
Dept: FAMILY MEDICINE CLINIC | Facility: CLINIC | Age: 39
End: 2019-01-17
Payer: COMMERCIAL

## 2019-01-17 VITALS
HEIGHT: 63 IN | RESPIRATION RATE: 16 BRPM | HEART RATE: 73 BPM | TEMPERATURE: 99 F | DIASTOLIC BLOOD PRESSURE: 74 MMHG | BODY MASS INDEX: 27.64 KG/M2 | OXYGEN SATURATION: 98 % | SYSTOLIC BLOOD PRESSURE: 112 MMHG | WEIGHT: 156 LBS

## 2019-01-17 DIAGNOSIS — R21 RASH AND NONSPECIFIC SKIN ERUPTION: Primary | ICD-10-CM

## 2019-01-17 PROCEDURE — 99213 OFFICE O/P EST LOW 20 MIN: CPT | Performed by: NURSE PRACTITIONER

## 2019-01-17 RX ORDER — BETAMETHASONE DIPROPIONATE 0.5 MG/G
1 CREAM TOPICAL DAILY
Qty: 45 G | Refills: 0 | Status: SHIPPED | OUTPATIENT
Start: 2019-01-17 | End: 2019-02-11 | Stop reason: ALTCHOICE

## 2019-01-17 NOTE — PATIENT INSTRUCTIONS
Contact Dermatitis  Contact dermatitis is a skin rash caused by something that touches the skin and makes it irritated and inflamed. Your skin may be red, swollen, dry, and may be cracked. Blisters may form and ooze. The rash will itch.   Contact dermatit · You can also try wet dressings. One way to do this is to wear a wet piece of clothing under a dry one. Wear a damp shirt under a dry shirt if your upper body is affected. This can relieve itching and prevent you from scratching the affected area.   · You © 5884-7830 [employer] [address]. [address] [facility] [place], [address] [place]. All rights reserved. This information is not intended as a substitute for professional medical care. Always follow your healthcare professional's instructions.         Ranjan Bey Treatment for contact dermatitis  Treatment is done to help relieve itching and reduce inflammation. The rash should go away in a few days to a few weeks. Treatments include:  · Cool, moist compress. Use a clean damp cloth.  Put it on the area for 20 to 30

## 2019-01-17 NOTE — PROGRESS NOTES
CC: Rash. HPI:  This is a rash problem. The current episode started in the past 4 days. The problem has been persisting since onset. The affected locations include bilateral ears and temples . The rash is characterized by pruritic bumps   .  Exp Diabetes Mother       Social History    Tobacco Use      Smoking status: Never Smoker      Smokeless tobacco: Never Used    Alcohol use: Yes      Comment: socially    Drug use: No        REVIEW OF SYSTEMS:   GENERAL: feels well otherwise, no fever, no chil encounter       Imaging & Consults:  None    Take Benadryl 50 mg when home. May repeat with 25 mg Q4-6 hrs for the next 4 days. Skin care d/w the pt.    Continue with Triamcinolone cream as prescribed by Dr. Rosy Mccartney  The patient indicates understanding of t

## 2019-01-17 NOTE — TELEPHONE ENCOUNTER
Called patient's  - verified patient's name and  -  states pt's rash is spreading, started by her left ear but is now on both temples and her neck, states pt's scalp is itchy.   states rash looks the same as when Dr Cecilia Gray saw it, j

## 2019-01-21 ENCOUNTER — TELEPHONE (OUTPATIENT)
Dept: FAMILY MEDICINE CLINIC | Facility: CLINIC | Age: 39
End: 2019-01-21

## 2019-01-21 NOTE — TELEPHONE ENCOUNTER
Dr. Alise Manriquez. [address]   Phone: [phone]    Left detailed message for pt with info.

## 2019-01-21 NOTE — TELEPHONE ENCOUNTER
Rash is now on her chest and face, no puss, no crusty. + swelling,    Seen in UC. Triamcinolone cream not working, was told betamethasone not to appy to face  [facility]  [address] [facility] for derm referral- who do you recommend?

## 2019-01-21 NOTE — TELEPHONE ENCOUNTER
LMTCB  Did triamcinolone cream not work for pt? Plan is to : Plans triamcinolone cream applied 3 times a day for 1 week.   If not resolved call for betamethasone 0.05% cream apply daily for 1 week

## 2019-01-21 NOTE — TELEPHONE ENCOUNTER
Pt's  is calling to request a recommendation for a dermatologist for pt's care. Please call and advise.

## 2019-02-11 ENCOUNTER — TELEPHONE (OUTPATIENT)
Dept: PHYSICAL THERAPY | Age: 39
End: 2019-02-11

## 2019-02-11 ENCOUNTER — OFFICE VISIT (OUTPATIENT)
Dept: FAMILY MEDICINE CLINIC | Facility: CLINIC | Age: 39
End: 2019-02-11
Payer: COMMERCIAL

## 2019-02-11 VITALS
SYSTOLIC BLOOD PRESSURE: 116 MMHG | HEART RATE: 93 BPM | RESPIRATION RATE: 18 BRPM | BODY MASS INDEX: 28 KG/M2 | HEIGHT: 63 IN | TEMPERATURE: 98 F | DIASTOLIC BLOOD PRESSURE: 74 MMHG | WEIGHT: 158 LBS | OXYGEN SATURATION: 99 %

## 2019-02-11 DIAGNOSIS — M54.12 CERVICAL RADICULOPATHY: ICD-10-CM

## 2019-02-11 DIAGNOSIS — R29.898 WEAKNESS OF RIGHT HAND: Primary | ICD-10-CM

## 2019-02-11 PROCEDURE — 99213 OFFICE O/P EST LOW 20 MIN: CPT | Performed by: FAMILY MEDICINE

## 2019-02-11 RX ORDER — GABAPENTIN 100 MG/1
100 CAPSULE ORAL 3 TIMES DAILY
Qty: 90 CAPSULE | Refills: 1 | Status: SHIPPED | OUTPATIENT
Start: 2019-02-11 | End: 2019-07-17 | Stop reason: ALTCHOICE

## 2019-02-11 NOTE — PROGRESS NOTES
HPI:    Patient ID: Imagene Schwab is a 34year old female. Arm Pain    The pain is present in the right hand and right arm. This is a new problem. Episode onset: 1/28- Pt states it started after her flight to [facility].  There has been no history of extremi edema or deformity. positive spurlings test and was giving right sided pain, weakness in the 3rd and 4th digits of the right hand, squeezing of carpal improve the pain in the fingers, but moderate to severe pain the right forearm   Vitals reviewed.

## 2019-02-12 ENCOUNTER — OFFICE VISIT (OUTPATIENT)
Dept: PHYSICAL THERAPY | Age: 39
End: 2019-02-12
Attending: FAMILY MEDICINE
Payer: COMMERCIAL

## 2019-02-12 DIAGNOSIS — R29.898 WEAKNESS OF RIGHT HAND: ICD-10-CM

## 2019-02-12 DIAGNOSIS — M54.12 CERVICAL RADICULOPATHY: ICD-10-CM

## 2019-02-12 PROCEDURE — 97110 THERAPEUTIC EXERCISES: CPT

## 2019-02-12 PROCEDURE — 97161 PT EVAL LOW COMPLEX 20 MIN: CPT

## 2019-02-13 NOTE — PROGRESS NOTES
SPINE EVALUATION:   Referring Physician: Dr. Manuel Grant  Diagnosis: cervical radiculopathy with R arm weakness Date of Service: 2/13/2019     PATIENT SUMMARY   Akira Parks is a 34year old y/o female who presents to therapy today with complaints of  Imani Fraction in the cervical spine   Palpation:  Tenderness noted cervical paraspinals and occipitals today.  Occasional HA per pt    Strength:   UE/Scapular LE     Rhomboids: R 3/5, L 3/5  Mid trap: R 3/5; L 3/5  Lats: R 2/5, L 2/5  Low trap: R 2/5; L 2/5   Strengt letter via fax as soon as possible to [phone].  If you have any questions, please contact me at Dept: [phone]    Sincerely,  Electronically signed by therapist: Garima Randle PT    Physician's certification required: Yes  I certify the need for these

## 2019-02-14 RX ORDER — SPIRONOLACTONE 50 MG/1
TABLET, FILM COATED ORAL
Qty: 90 TABLET | Refills: 0 | Status: SHIPPED | OUTPATIENT
Start: 2019-02-14 | End: 2019-07-17 | Stop reason: ALTCHOICE

## 2019-02-15 ENCOUNTER — OFFICE VISIT (OUTPATIENT)
Dept: PHYSICAL THERAPY | Age: 39
End: 2019-02-15
Attending: FAMILY MEDICINE
Payer: COMMERCIAL

## 2019-02-15 PROCEDURE — 97110 THERAPEUTIC EXERCISES: CPT

## 2019-02-15 PROCEDURE — 97140 MANUAL THERAPY 1/> REGIONS: CPT

## 2019-02-15 NOTE — PROGRESS NOTES
Dx:  Cervical pain with R radiculopathy     Authorized # of Visits:  120        Next MD visit: none scheduled  Fall Risk: standard         Precautions: n/a             Subjective:  Feeling 6/10. Changed the position of how I was sleeping to lay on the [facility]

## 2019-02-19 ENCOUNTER — OFFICE VISIT (OUTPATIENT)
Dept: PHYSICAL THERAPY | Age: 39
End: 2019-02-19
Attending: FAMILY MEDICINE
Payer: COMMERCIAL

## 2019-02-19 PROCEDURE — 97140 MANUAL THERAPY 1/> REGIONS: CPT

## 2019-02-19 PROCEDURE — 97012 MECHANICAL TRACTION THERAPY: CPT

## 2019-02-19 PROCEDURE — 97110 THERAPEUTIC EXERCISES: CPT

## 2019-02-19 NOTE — PROGRESS NOTES
Dx:  Cervical pain with R radiculopathy     Authorized # of Visits:  120        Next MD visit: none scheduled  Fall Risk: standard         Precautions: n/a             Subjective:   Felt a little something into my L arm coming over today.  Usually the right MHP 10 mins                                     Skilled Services:  Skilled manual PT today -  Issued specific exercises to pt for strengthening   Charges: EX 1 MT 1 traction 1 ([facility])      Total Timed Treatment: 45 min  Total Treatment Time: 45 min

## 2019-02-22 ENCOUNTER — OFFICE VISIT (OUTPATIENT)
Dept: PHYSICAL THERAPY | Age: 39
End: 2019-02-22
Attending: FAMILY MEDICINE
Payer: COMMERCIAL

## 2019-02-22 PROCEDURE — 97110 THERAPEUTIC EXERCISES: CPT

## 2019-02-22 PROCEDURE — 97140 MANUAL THERAPY 1/> REGIONS: CPT

## 2019-02-22 PROCEDURE — 97012 MECHANICAL TRACTION THERAPY: CPT

## 2019-02-22 NOTE — PROGRESS NOTES
Dx:  Cervical pain with R radiculopathy     Authorized # of Visits:  120        Next MD visit: none scheduled  Fall Risk: standard         Precautions: n/a             Subjective:   Pt is feeling intermittent pain.  This am no pain, as I move around I get t nerve 10 x 2   Head away from the R arm  10 x  2 sets   Red band flex with band  Red band ext 10 x     Wall push ups 10 x 2        Rows red band   10 x    Rows with green band and shoulder ext green band 10 x each   Standing flex and scap to 90 degs 2# 10

## 2019-02-26 ENCOUNTER — OFFICE VISIT (OUTPATIENT)
Dept: PHYSICAL THERAPY | Age: 39
End: 2019-02-26
Attending: FAMILY MEDICINE
Payer: COMMERCIAL

## 2019-02-26 PROCEDURE — 97110 THERAPEUTIC EXERCISES: CPT

## 2019-02-26 PROCEDURE — 97012 MECHANICAL TRACTION THERAPY: CPT

## 2019-02-26 NOTE — PROGRESS NOTES
Dx:  Cervical pain with R radiculopathy     Authorized # of Visits:  12       Next MD visit: none scheduled  Fall Risk: standard         Precautions: n/a             Subjective:  No pain or symptoms upon arrival today. Intermittent at times.  I felt like so Supine: manual cervical traction  Medial nerve glide 10 x R STM upper traps B   PA glides to cervical spine grade 2-3 mult bouts   Thoracic mobs   10 mins   Passive distraction with seated active rotation by pt  3 x each side  Prone rows and ext 10 x 0#

## 2019-03-05 ENCOUNTER — OFFICE VISIT (OUTPATIENT)
Dept: PHYSICAL THERAPY | Age: 39
End: 2019-03-05
Attending: FAMILY MEDICINE
Payer: COMMERCIAL

## 2019-03-05 PROCEDURE — 97110 THERAPEUTIC EXERCISES: CPT

## 2019-03-05 PROCEDURE — 97140 MANUAL THERAPY 1/> REGIONS: CPT

## 2019-03-05 PROCEDURE — 97012 MECHANICAL TRACTION THERAPY: CPT

## 2019-03-06 NOTE — PROGRESS NOTES
Dx:  Cervical pain with R radiculopathy     Authorized # of Visits:  12       Next MD visit: none scheduled  Fall Risk: standard         Precautions: n/a             Subjective:  Less frequent symptoms. Pt is feeling intermittent pain to her elbow.  Slightl 2 sets   Red band flex with band  Red band ext 10 x     Wall push ups 10 x 2  ER/IR green band 10 x each  Red for ER     Flex and scap 2 # 10 x each with cues for form    STM R upper trap PA glides to cervical spine mult bouts grade 2-3   10 mins   Mechan

## 2019-03-08 ENCOUNTER — OFFICE VISIT (OUTPATIENT)
Dept: PHYSICAL THERAPY | Age: 39
End: 2019-03-08
Attending: FAMILY MEDICINE
Payer: COMMERCIAL

## 2019-03-08 PROCEDURE — 97012 MECHANICAL TRACTION THERAPY: CPT

## 2019-03-08 PROCEDURE — 97110 THERAPEUTIC EXERCISES: CPT

## 2019-03-08 PROCEDURE — 97140 MANUAL THERAPY 1/> REGIONS: CPT

## 2019-03-08 NOTE — PROGRESS NOTES
Dx:  Cervical pain with R radiculopathy     Authorized # of Visits:  12       Next MD visit: none scheduled  Fall Risk: standard         Precautions: n/a             Subjective:  L upper trap more in the evening and the evening.  Driving brings on my tingli traction  Medial nerve glide 10 x R STM upper traps B   PA glides to cervical spine grade 2-3 mult bouts   Thoracic mobs   10 mins   Passive distraction with seated active rotation by pt  3 x each side  Prone rows and ext 10 x 0# ROWs green band 10 x 2   M

## 2019-03-12 ENCOUNTER — APPOINTMENT (OUTPATIENT)
Dept: PHYSICAL THERAPY | Age: 39
End: 2019-03-12
Attending: FAMILY MEDICINE
Payer: COMMERCIAL

## 2019-03-19 ENCOUNTER — OFFICE VISIT (OUTPATIENT)
Dept: PHYSICAL THERAPY | Age: 39
End: 2019-03-19
Attending: FAMILY MEDICINE
Payer: COMMERCIAL

## 2019-03-19 PROCEDURE — 97110 THERAPEUTIC EXERCISES: CPT

## 2019-03-19 PROCEDURE — 97140 MANUAL THERAPY 1/> REGIONS: CPT

## 2019-03-19 NOTE — PROGRESS NOTES
Dx:  Cervical pain with R radiculopathy     Authorized # of Visits:  12       Next MD visit: none scheduled  Fall Risk: standard         Precautions: n/a             Subjective: back from my work trip.   My lateral arm was so bad most of the week on my work grade 2-3 mult bouts uni   10 mins     Rows and scap retractions 10 in prone        STM R upper trap and medial blade  PA glides to the cervical spine grade 2-3 mult bouts of uni  Attempted CT junction manipulation   Supine: manual cervical traction  Media roller                  estim with MHP 10 mins                                     Skilled Services:  Skilled manual PT today -  Issued specific exercises to pt for strengthening   Charges:   EX 2 MT 1      Total Timed Treatment: 45 min  Total Treatment Ti

## 2019-03-22 ENCOUNTER — OFFICE VISIT (OUTPATIENT)
Dept: PHYSICAL THERAPY | Age: 39
End: 2019-03-22
Attending: FAMILY MEDICINE
Payer: COMMERCIAL

## 2019-03-22 PROCEDURE — 97110 THERAPEUTIC EXERCISES: CPT

## 2019-03-22 NOTE — PROGRESS NOTES
Dx:  Cervical pain with R radiculopathy     Authorized # of Visits:  12       Next MD visit: none scheduled  Fall Risk: standard         Precautions: n/a            Subjective:    Pt is feeling  Symptoms in the right arm and minimal in the L.  Since her ret Date:               TX#: 8/10  3/19/19   9/10  3/22/19   UBE 1 mins fwd  1 min bkwd UBE 1 mins fwd  1 min bkwd UBE 1 min in each direction  UBE 1 min fwd  1 min bkwd UBE 2 mins fwd  2 mins bkwd UBE 2 mins fwd  2 mins bkwd Prone STM to the Upper R trap   PA Roller :  pec stretch 3 x 30 secs  Reach up and down 10 x   Alt long UE stretch 10 x   Traction 5 mins stopped due to increase in symptoms STM B upper traps   UT and levator stretch 3 x 30 secs    Education and use of the home cervical traction unit 10 to be I with HEP  2. Pt to demonstrate 80 degs of rotation to check blind spot pain free to the R in 8 visits  3. Pt to report centralization of symptoms from the R into the cervical spine 2 weeks  4.  Increase R  strength to stop dropping objects and h over foam roller 3 mins   Mechanical traction 15 mins 20-15 #    1 # prone rows  0# scap and retraction 10 x   Standing 1 # flex and scap 10 x with cervical retraction     Roller thoracic ext 10 x   STM L upper trap and PA glides to cervical spine mult roberto her medial nerve glides and stretches at home. Dull ache in the lateral arm seemed to linger duration of the session. Response to traction unusual today most of the time it does help alleviate symptoms.  No changes in strength or reflexes on the right   [facility] 15 mins   20-15# with heat       Rows red band   10 x     Flex and scap with slow tempo 2 # 10 x each     Prone rows and scap retractions 10 x     Manual PT to the upper traps and mobs to the cervical spine and thoracic spine  Mult bouts of grade 2-3    Th the bicep/tricep    Assessment:    Had to stop traction today due to pain in the lateral right arm today increasing. Pt felt better prior to traction. She was planning on taking her anti inflamm meds as prescribed by MD after her session today.  Advised to sets   Red band flex with band  Red band ext 10 x     Wall push ups 10 x 2  ER/IR green band 10 x each  Red for ER     Flex and scap 2 # 10 x each with cues for form    STM R upper trap PA glides to cervical spine mult bouts grade 2-3   10 mins   Mechanica lateral right arm, tingling into my finger on the R  Objective:    + R upper trap and lateral scap border with STM.  PA Glides to R cervical spine did help with reduction of symptoms today in prone  Reflexes normal on the R. Strength 5/5 on the right should upper traps B   PA glides to cervical spine grade 2-3 mult bouts   Thoracic mobs   10 mins   Passive distraction with seated active rotation by pt  3 x each side  Prone rows and ext 10 x 0# ROWs green band 10 x 2   Median nerve 10 x 2   Head away from the Cervical pain with R radiculopathy     Authorized # of Visits:  12       Next MD visit: none scheduled  Fall Risk: standard         Precautions: n/a             Subjective: back from my work trip.   My lateral arm was so bad most of the week on my work trip spine grade 2-3 mult bouts uni   10 mins     Rows and scap retractions 10 in prone      UBE 2 mins fwd  2 mins bkwd       STM R upper trap and medial blade  PA glides to the cervical spine grade 2-3 mult bouts of uni  Attempted CT junction manipulation   S mins 20-15# with MHP         Ext over foam roller                    estim with MHP 10 mins                                         Skilled Services:  Skilled manual PT today -  Issued specific exercises to pt for strengthening   Charges:   EX 2 MT 1

## 2019-04-02 ENCOUNTER — OFFICE VISIT (OUTPATIENT)
Dept: PHYSICAL THERAPY | Age: 39
End: 2019-04-02
Attending: FAMILY MEDICINE
Payer: COMMERCIAL

## 2019-04-02 PROCEDURE — 97110 THERAPEUTIC EXERCISES: CPT

## 2019-04-02 PROCEDURE — 97140 MANUAL THERAPY 1/> REGIONS: CPT

## 2019-04-02 NOTE — PROGRESS NOTES
Dx:  Cervical pain with R radiculopathy     Authorized # of Visits:  12       Next MD visit: none scheduled  Fall Risk: standard         Precautions: n/a            Subjective:   Took steroid for the past week and feeling less pain in the right arm, josefina direction    PA glides cervical spine and thoracic spine grade 2-3 mult bouts 30 secs    Foam roller pec stretch and reach ups and downs 10 x each  Re-asssessment today 5 mins     PA glides to the cervical and thoracic spine grade 2-3 mult bouts of 20 secs use of the home cervical traction unit 10 mins  Foam roller thoracic mobs and stretch 4 mins     Rows and scap retraction in prone 10 x each 2#     estim with MHP R upper trap and cervical spine 15 mins    Rows red band   10 x    Rows with green band and s rotation to check blind spot pain free to the R in 8 visits  3. Pt to report centralization of symptoms from the R into the cervical spine 2 weeks  4. Increase R  strength to stop dropping objects and hold objects  5.  Increase LT one grade to improve p mins 20-15 #    1 # prone rows  0# scap and retraction 10 x   Standing 1 # flex and scap 10 x with cervical retraction     Roller thoracic ext 10 x   STM L upper trap and PA glides to cervical spine mult bouts of grade 2-3 uni L and right  Occipital releas Dull ache in the lateral arm seemed to linger duration of the session. Response to traction unusual today most of the time it does help alleviate symptoms. No changes in strength or reflexes on the right   Goals:   1.  Pt to be I with HEP  2. Pt to demonstr 10 x     Flex and scap with slow tempo 2 # 10 x each     Prone rows and scap retractions 10 x     Manual PT to the upper traps and mobs to the cervical spine and thoracic spine  Mult bouts of grade 2-3    Thoracic ext over foam roller 3 mins   Mechanical traction today due to pain in the lateral right arm today increasing. Pt felt better prior to traction. She was planning on taking her anti inflamm meds as prescribed by MD after her session today.  Advised to cont with her medial nerve glides and stretches x     Wall push ups 10 x 2  ER/IR green band 10 x each  Red for ER     Flex and scap 2 # 10 x each with cues for form    STM R upper trap PA glides to cervical spine mult bouts grade 2-3   10 mins   Mechanical traction 15 mins   20-15# with heat       Rows R  Objective:    + R upper trap and lateral scap border with STM.  PA Glides to R cervical spine did help with reduction of symptoms today in prone  Reflexes normal on the R. Strength 5/5 on the right shoulder flex/abd and the bicep/tricep    Assessment: 2-3 mult bouts   Thoracic mobs   10 mins   Passive distraction with seated active rotation by pt  3 x each side  Prone rows and ext 10 x 0# ROWs green band 10 x 2   Median nerve 10 x 2   Head away from the R arm  10 x  2 sets   Red band flex with band  Red Visits:  12       Next MD visit: none scheduled  Fall Risk: standard         Precautions: n/a             Subjective: back from my work trip. My lateral arm was so bad most of the week on my work trip.  Today in the lateral right arm, tingling into my fing scap retractions 10 in prone      UBE 2 mins fwd  2 mins bkwd       STM R upper trap and medial blade  PA glides to the cervical spine grade 2-3 mult bouts of uni  Attempted CT junction manipulation   Supine: manual cervical traction  Medial nerve glide 10 estim with MHP 10 mins                                         Skilled Services:  Skilled manual PT today -  Issued specific exercises to pt for strengthening   Charges:   EX 2 MT 1      Total Timed Treatment: 45 min  Total Treatment Time: 39 m

## 2019-04-02 NOTE — PROGRESS NOTES
Progress  Summary    Pt has attended9, cancelled 0, and no shown 0 visits in Physical Therapy.      Subjective:   Took steroid for the past week and feeling less pain in the right arm, however I am feeling more discomfort in the back of my head into the fr therapist: Mario Gupta, PT  [de-identified] certification required: Yes  I certify the need for these services furnished under this plan of treatment and while under my care.     X___________________________________________________ Date____________________

## 2019-04-05 ENCOUNTER — OFFICE VISIT (OUTPATIENT)
Dept: PHYSICAL THERAPY | Age: 39
End: 2019-04-05
Attending: FAMILY MEDICINE
Payer: COMMERCIAL

## 2019-04-05 PROCEDURE — 97110 THERAPEUTIC EXERCISES: CPT

## 2019-04-05 PROCEDURE — 97140 MANUAL THERAPY 1/> REGIONS: CPT

## 2019-04-05 NOTE — PROGRESS NOTES
Dx:  Cervical pain with R radiculopathy     Authorized # of Visits:  12       Next MD visit: none scheduled  Fall Risk: standard         Precautions: n/a            Subjective:    No pain in the arm, still feeling pressure in the back of my neck into my fr grade 2-3 mult bouts 30 secs    Foam roller pec stretch and reach ups and downs 10 x each  Re-asssessment today 5 mins     PA glides to the cervical and thoracic spine grade 2-3 mult bouts of 20 secs    STM to the upper traps and cervical paraspinals occip stretch 3 x 30 secs  Reach up and down 10 x   Alt long UE stretch 10 x   Traction 5 mins stopped due to increase in symptoms STM B upper traps   UT and levator stretch 3 x 30 secs    Education and use of the home cervical traction unit 10 mins  Foam roller the session. Response to traction unusual today most of the time it does help alleviate symptoms. No changes in strength or reflexes on the right   Goals:   1.  Pt to be I with HEP  2. Pt to demonstrate 80 degs of rotation to check blind spot pain free to t Prone rows and scap retractions 10 x     Manual PT to the upper traps and mobs to the cervical spine and thoracic spine  Mult bouts of grade 2-3    Thoracic ext over foam roller 3 mins   Mechanical traction 15 mins 20-15 #    1 # prone rows  0# scap an today increasing. Pt felt better prior to traction. She was planning on taking her anti inflamm meds as prescribed by MD after her session today. Advised to cont with her medial nerve glides and stretches at home.  Dull ache in the lateral arm seemed to rosemary each  Red for ER     Flex and scap 2 # 10 x each with cues for form    STM R upper trap PA glides to cervical spine mult bouts grade 2-3   10 mins   Mechanical traction 15 mins   20-15# with heat       Rows red band   10 x     Flex and scap with slow tempo border with STM.  PA Glides to R cervical spine did help with reduction of symptoms today in prone  Reflexes normal on the R. Strength 5/5 on the right shoulder flex/abd and the bicep/tricep    Assessment:    Had to stop traction today due to pain in the la Passive distraction with seated active rotation by pt  3 x each side  Prone rows and ext 10 x 0# ROWs green band 10 x 2   Median nerve 10 x 2   Head away from the R arm  10 x  2 sets   Red band flex with band  Red band ext 10 x     Wall push ups 10 x 2 scheduled  Fall Risk: standard         Precautions: n/a             Subjective: back from my work trip. My lateral arm was so bad most of the week on my work trip.  Today in the lateral right arm, tingling into my finger on the R  Objective:    + R upper t and medial blade  PA glides to the cervical spine grade 2-3 mult bouts of uni  Attempted CT junction manipulation   Supine: manual cervical traction  Medial nerve glide 10 x R STM upper traps B   PA glides to cervical spine grade 2-3 mult bouts   Thoracic Skilled manual PT today -  Issued specific exercises to pt for strengthening   Charges:   EX 2 MT 1      Total Timed Treatment: 45 min  Total Treatment Time: 45 min    Dx:  Cervical pain with R radiculopathy     Authorized # of Visits:  12       Next MD emery fwd  1 min bkwd UBE 1 min in each direction  UBE 1 min fwd  1 min bkwd UBE 2 mins fwd  2 mins bkwd UBE 2 mins fwd  2 mins bkwd Prone STM to the Upper R trap   CHASITY vences cervical spine grade 2-3 mult bouts uni   10 mins     Rows and scap retractions 10 in p to 90 degs 2# 10 x     mech traction 15 mins 20-10#  MT: prone thoracic mobs  Cervical mobs grade 2-3 mult bouts uni only   STM R upper trap 10 mins     Mechanical traction 15 mins 20-15# with MHP         Ext over foam roller                    estim with

## 2019-04-09 ENCOUNTER — OFFICE VISIT (OUTPATIENT)
Dept: PHYSICAL THERAPY | Age: 39
End: 2019-04-09
Attending: FAMILY MEDICINE
Payer: COMMERCIAL

## 2019-04-09 PROCEDURE — 97110 THERAPEUTIC EXERCISES: CPT

## 2019-04-09 PROCEDURE — 97140 MANUAL THERAPY 1/> REGIONS: CPT

## 2019-04-09 NOTE — PROGRESS NOTES
Dx:  Cervical pain with R radiculopathy     Authorized # of Visits:  12       Next MD visit: none scheduled  Fall Risk: standard         Precautions: n/a            Subjective:     Better Sunday and Monday. 3/10.  Driving here, pain increased 6/10 central c 10 x each  Re-asssessment today 5 mins     PA glides to the cervical and thoracic spine grade 2-3 mult bouts of 20 secs    STM to the upper traps and cervical paraspinals occipital release 15 mins  UBe to warm up 4 mins total    STM: B upper trap PA glides mult bouts of grade 2-3 uni L and right  Occipital release 4 mins   Mechanical traction 15 mins 20-15# with MHP  Standing rows green band 10 x 2     Roller :  pec stretch 3 x 30 secs  Reach up and down 10 x   Alt long UE stretch 10 x   Traction 5 mins stop her anti inflamm meds as prescribed by MD after her session today. Advised to cont with her medial nerve glides and stretches at home. Dull ache in the lateral arm seemed to linger duration of the session.  Response to traction unusual today most of the delgado upper trap PA glides to cervical spine mult bouts grade 2-3   10 mins   Mechanical traction 15 mins   20-15# with heat       Rows red band   10 x     Flex and scap with slow tempo 2 # 10 x each     Prone rows and scap retractions 10 x     Manual PT to the symptoms today in prone  Reflexes normal on the R. Strength 5/5 on the right shoulder flex/abd and the bicep/tricep    Assessment:    Had to stop traction today due to pain in the lateral right arm today increasing. Pt felt better prior to traction.  She wa rows and ext 10 x 0# ROWs green band 10 x 2   Median nerve 10 x 2   Head away from the R arm  10 x  2 sets   Red band flex with band  Red band ext 10 x     Wall push ups 10 x 2  ER/IR green band 10 x each  Red for ER     Flex and scap 2 # 10 x each with cu back from my work trip. My lateral arm was so bad most of the week on my work trip. Today in the lateral right arm, tingling into my finger on the R  Objective:    + R upper trap and lateral scap border with STM.  PA Glides to R cervical spine did help wit uni  Attempted CT junction manipulation   Supine: manual cervical traction  Medial nerve glide 10 x R STM upper traps B   PA glides to cervical spine grade 2-3 mult bouts   Thoracic mobs   10 mins   Passive distraction with seated active rotation by pt  3 strengthening   Charges:   EX 2 MT 1      Total Timed Treatment: 45 min  Total Treatment Time: 45 min    Dx:  Cervical pain with R radiculopathy     Authorized # of Visits:  12       Next MD visit: none scheduled  Fall Risk: standard         Precautions: n fwd  2 mins bkwd UBE 2 mins fwd  2 mins bkwd Prone STM to the Upper R trap   PA glides cervical spine grade 2-3 mult bouts uni   10 mins     Rows and scap retractions 10 in prone        STM R upper trap and medial blade  PA glides to the cervical spine gra uni only   STM R upper trap 10 mins     Mechanical traction 15 mins 20-15# with MHP        Ext over foam roller                  estim with MHP 10 mins                                     Skilled Services:  Skilled manual PT today -  Issued specific exerci TX#: 5/10  2.26.19 Date:               TX#: 6/10  3/5/19 Date:               TX#: 7/10  3/8/19 Date:               TX#: 8/10  3/19/19   9/10  3/22/19   UBE 1 mins fwd  1 min bkwd UBE 1 mins fwd  1 min bkwd UBE 1 min in each direction  UBE 1 min f down 10 x   Alt long UE stretch 10 x   Traction 5 mins stopped due to increase in symptoms    Rows red band   10 x    Rows with green band and shoulder ext green band 10 x each   Standing flex and scap to 90 degs 2# 10 x     mech traction 15 mins 20-10#  M

## 2019-04-12 ENCOUNTER — OFFICE VISIT (OUTPATIENT)
Dept: PHYSICAL THERAPY | Age: 39
End: 2019-04-12
Attending: FAMILY MEDICINE
Payer: COMMERCIAL

## 2019-04-12 PROCEDURE — 97110 THERAPEUTIC EXERCISES: CPT

## 2019-04-12 PROCEDURE — 97140 MANUAL THERAPY 1/> REGIONS: CPT

## 2019-04-12 NOTE — PROGRESS NOTES
Dx:  Cervical pain with R radiculopathy     Authorized # of Visits:  12       Next MD visit: none scheduled  Fall Risk: standard         Precautions: n/a            Subjective:     Saw pain management and they are going to perform a cortisone injection.  Onelia Aguayo each direction    PA glides cervical spine and thoracic spine grade 2-3 mult bouts 30 secs    Foam roller pec stretch and reach ups and downs 10 x each  Re-asssessment today 5 mins     PA glides to the cervical and thoracic spine grade 2-3 mult bouts of 20 each     Prone rows and scap retractions 10 x     Manual PT to the upper traps and mobs to the cervical spine and thoracic spine  Mult bouts of grade 2-3    Thoracic ext over foam roller 3 mins   Mechanical traction 15 mins 20-15 #    1 # prone rows  0# sc tingling into my finger on the R  Objective:    + R upper trap and lateral scap border with STM.  PA Glides to R cervical spine did help with reduction of symptoms today in prone  Reflexes normal on the R. Strength 5/5 on the right shoulder flex/abd and the glides to cervical spine grade 2-3 mult bouts   Thoracic mobs   10 mins   Passive distraction with seated active rotation by pt  3 x each side  Prone rows and ext 10 x 0# ROWs green band 10 x 2   Median nerve 10 x 2   Head away from the R arm  10 x  2 sets radiculopathy     Authorized # of Visits:  12       Next MD visit: none scheduled  Fall Risk: standard         Precautions: n/a             Subjective: back from my work trip. My lateral arm was so bad most of the week on my work trip.  Today in the [address] [place] [address] mins     Rows and scap retractions 10 in prone        STM R upper trap and medial blade  PA glides to the cervical spine grade 2-3 mult bouts of uni  Attempted CT junction manipulation   Supine: manual cervical traction  Medial nerve glide 10 x R STM upper with MHP 10 mins                                     Skilled Services:  Skilled manual PT today -  Issued specific exercises to pt for strengthening   Charges:   EX 2 MT 1      Total Timed Treatment: 45 min  Total Treatment Time: 45 min    Dx:  Cervical pa 8/10  3/19/19     UBE 1 mins fwd  1 min bkwd UBE 1 mins fwd  1 min bkwd UBE 1 min in each direction  UBE 1 min fwd  1 min bkwd UBE 2 mins fwd  2 mins bkwd UBE 2 mins fwd  2 mins bkwd Prone STM to the Upper R trap   CHASITY vences cervical spine grade 2-3 mult b Standing flex and scap to 90 degs 2# 10 x     mech traction 15 mins 20-10#  MT: prone thoracic mobs  Cervical mobs grade 2-3 mult bouts uni only   STM R upper trap 10 mins     Mechanical traction 15 mins 20-15# with MHP        Ext over foam roller returns from her work trip.    Date: 2/15/2019 TX#: 2 10  Date:               TX#: 3/10   Date:               TX#: 4/10  2/21/19 Date:               TX#: 5/10  2.26.19 Date:               TX#: 6/10  3/5/19 Date:               TX#: 7/10  3/8/19 Date: MHP  Standing rows green band 10 x 2     Roller :  pec stretch 3 x 30 secs  Reach up and down 10 x   Alt long UE stretch 10 x   Traction 5 mins stopped due to increase in symptoms   Rows red band   10 x    Rows with green band and shoulder ext green band 1 to the R in 8 visits  3. Pt to report centralization of symptoms from the R into the cervical spine 2 weeks  4. Increase R  strength to stop dropping objects and hold objects  5.  Increase LT one grade to improve posture       Plan:   Pt to cont with PT traction 15 mins 20-15 #    1 # prone rows  0# scap and retraction 10 x   Standing 1 # flex and scap 10 x with cervical retraction     Roller thoracic ext 10 x   STM L upper trap and PA glides to cervical spine mult bouts of grade 2-3 uni L and right  Occi report centralization of symptoms from the R into the cervical spine 2 weeks  4. Increase R  strength to stop dropping objects and hold objects  5.  Increase LT one grade to improve posture       Plan:   Pt does seem to be having less symptoms at this t manual cervical traction  Medial nerve glide 10 x R STM upper traps B   PA glides to cervical spine grade 2-3 mult bouts   Thoracic mobs   10 mins   Passive distraction with seated active rotation by pt  3 x each side  Prone rows and ext 10 x 0# ROWs green trap 10 mins     Mechanical traction 15 mins 20-15# with MHP            Ext over foam roller                          estim with MHP 10 mins                                                     Skilled Services:  Skilled manual PT today -  Issued specific e Date:               TX#: 5/10  2.26.19 Date:               TX#: 6/10  3/5/19 Date:               TX#: 7/10  3/8/19 Date:               TX#: 8/10  3/19/19     UBE 1 mins fwd  1 min bkwd UBE 1 mins fwd  1 min bkwd UBE 1 min in each direction  UBE 1 min fwd Traction 5 mins stopped due to increase in symptoms   Rows red band   10 x    Rows with green band and shoulder ext green band 10 x each   Standing flex and scap to 90 degs 2# 10 x     mech traction 15 mins 20-10#  MT: prone thoracic mobs  Cervical mobs  strength to stop dropping objects and hold objects  5. Increase LT one grade to improve posture       Plan:   Pt to cont with PT once she returns from her work trip.    Date: 2/15/2019 TX#: 2 10  Date:               TX#: 3/10   Date:               TX#: trap and PA glides to cervical spine mult bouts of grade 2-3 uni L and right  Occipital release 4 mins   Mechanical traction 15 mins 20-15# with MHP  Standing rows green band 10 x 2     Roller :  pec stretch 3 x 30 secs  Reach up and down 10 x   Alt long U changes in strength or reflexes on the right   Goals:   1. Pt to be I with HEP  2. Pt to demonstrate 80 degs of rotation to check blind spot pain free to the R in 8 visits  3.  Pt to report centralization of symptoms from the R into the cervical spine 2 vishal spine and thoracic spine  Mult bouts of grade 2-3    Thoracic ext over foam roller 3 mins   Mechanical traction 15 mins 20-15 #    1 # prone rows  0# scap and retraction 10 x   Standing 1 # flex and scap 10 x with cervical retraction     Roller thoracic ex as prescribed by MD after her session today. Advised to cont with her medial nerve glides and stretches at home. Dull ache in the lateral arm seemed to linger duration of the session.  Response to traction unusual today most of the time it does help allevia cervical spine mult bouts grade 2-3   10 mins   Mechanical traction 15 mins   20-15# with heat       Rows red band   10 x     Flex and scap with slow tempo 2 # 10 x each     Prone rows and scap retractions 10 x     Manual PT to the upper traps and mobs to prone  Reflexes normal on the R. Strength 5/5 on the right shoulder flex/abd and the bicep/tricep    Assessment:    Had to stop traction today due to pain in the lateral right arm today increasing. Pt felt better prior to traction.  She was planning on taki pt  3 x each side  Prone rows and ext 10 x 0# ROWs green band 10 x 2   Median nerve 10 x 2   Head away from the R arm  10 x  2 sets   Red band flex with band  Red band ext 10 x     Wall push ups 10 x 2  ER/IR green band 10 x each  Red for ER     Flex and s

## 2019-04-16 ENCOUNTER — OFFICE VISIT (OUTPATIENT)
Dept: PHYSICAL THERAPY | Age: 39
End: 2019-04-16
Attending: FAMILY MEDICINE
Payer: COMMERCIAL

## 2019-04-16 PROCEDURE — 97110 THERAPEUTIC EXERCISES: CPT

## 2019-04-16 PROCEDURE — 97140 MANUAL THERAPY 1/> REGIONS: CPT

## 2019-04-16 NOTE — PROGRESS NOTES
Dx:  Cervical pain with R radiculopathy     Authorized # of Visits:  12       Next MD visit: none scheduled  Fall Risk: standard         Precautions: n/a            Subjective:    Pressure less due to sleeping on my side, comes and goes, does not stay as l cervical spine and thoracic spine grade 2-3 mult bouts 30 secs    Foam roller pec stretch and reach ups and downs 10 x each  Re-asssessment today 5 mins     PA glides to the cervical and thoracic spine grade 2-3 mult bouts of 20 secs    STM to the upper tr ups 10 x 2  ER/IR green band 10 x each  Red for ER     Flex and scap 2 # 10 x each with cues for form    STM R upper trap PA glides to cervical spine mult bouts grade 2-3   10 mins   Mechanical traction 15 mins   20-15# with heat       Rows red band   10 x min    Dx:  Cervical pain with R radiculopathy     Authorized # of Visits:  12       Next MD visit: none scheduled  Fall Risk: standard         Precautions: n/a             Subjective: back from my work trip.   My lateral arm was so bad most of the week on spine grade 2-3 mult bouts uni   10 mins     Rows and scap retractions 10 in prone        STM R upper trap and medial blade  PA glides to the cervical spine grade 2-3 mult bouts of uni  Attempted CT junction manipulation   Supine: manual cervical traction over foam roller                  estim with MHP 10 mins                                     Skilled Services:  Skilled manual PT today -  Issued specific exercises to pt for strengthening   Charges:   EX 2 MT 1      Total Timed Treatment: 45 min  Total Tr 7/10  3/8/19 Date:               TX#: 8/10  3/19/19     UBE 1 mins fwd  1 min bkwd UBE 1 mins fwd  1 min bkwd UBE 1 min in each direction  UBE 1 min fwd  1 min bkwd UBE 2 mins fwd  2 mins bkwd UBE 2 mins fwd  2 mins bkwd Prone STM to the Upper R trap   PA and shoulder ext green band 10 x each   Standing flex and scap to 90 degs 2# 10 x     mech traction 15 mins 20-10#  MT: prone thoracic mobs  Cervical mobs grade 2-3 mult bouts uni only   STM R upper trap 10 mins     Mechanical traction 15 mins 20-15# with Plan:   Pt to cont with PT once she returns from her work trip.    Date: 2/15/2019 TX#: 2 10  Date:               TX#: 3/10   Date:               TX#: 4/10  2/21/19 Date:               TX#: 5/10  2.26.19 Date:               TX#: 6/10  3/5/19 Date: Mechanical traction 15 mins 20-15# with MHP  Standing rows green band 10 x 2     Roller :  pec stretch 3 x 30 secs  Reach up and down 10 x   Alt long UE stretch 10 x   Traction 5 mins stopped due to increase in symptoms   Rows red band   10 x    Rows wit of rotation to check blind spot pain free to the R in 8 visits  3. Pt to report centralization of symptoms from the R into the cervical spine 2 weeks  4. Increase R  strength to stop dropping objects and hold objects  5.  Increase LT one grade to improv 15 mins 20-15 #    1 # prone rows  0# scap and retraction 10 x   Standing 1 # flex and scap 10 x with cervical retraction     Roller thoracic ext 10 x   STM L upper trap and PA glides to cervical spine mult bouts of grade 2-3 uni L and right  Occipital rel Dull ache in the lateral arm seemed to linger duration of the session. Response to traction unusual today most of the time it does help alleviate symptoms. No changes in strength or reflexes on the right   Goals:   1.  Pt to be I with HEP  2. Pt to demonstr 15 mins   20-15# with heat       Rows red band   10 x     Flex and scap with slow tempo 2 # 10 x each     Prone rows and scap retractions 10 x     Manual PT to the upper traps and mobs to the cervical spine and thoracic spine  Mult bouts of grade 2-3    Th tightness on the right. Cues for posture. Full cervical ROM post session. No arm radiculopathy R post visit. Review of using the tennis ball for occipital release. Goals:   1.  Pt to be I with HEP  2. Pt to demonstrate 80 degs of rotation to check blind weights    PA glides cervical spine grade 2-3 mult bouts central and uni   10 mins    PROM: cervical spine  Occipital release 5 mins  Manual distraction 5 mins in supine   STM R upper trap and medial blade  PA glides to the cervical spine grade 2-3 mult león upper trap and cervical spine 15 mins   CT junction manipulation x 1    Rows red band   10 x    Rows with green band and shoulder ext green band 10 x each   Standing flex and scap to 90 degs 2# 10 x     mech traction 15 mins 20-10#  MT: prone thoracic mobs the cervical spine 2 weeks  4. Increase R  strength to stop dropping objects and hold objects  5. Increase LT one grade to improve posture       Plan:   Pt to cont with PT once she returns from her work trip.    Date: 2/15/2019 TX#: 2 10  Date: Roller thoracic ext 10 x   STM L upper trap and PA glides to cervical spine mult bouts of grade 2-3 uni L and right  Occipital release 4 mins   Mechanical traction 15 mins 20-15# with MHP  Standing rows green band 10 x 2     Roller :  pec stretch 3 x 3 it does help alleviate symptoms. No changes in strength or reflexes on the right   Goals:   1. Pt to be I with HEP  2. Pt to demonstrate 80 degs of rotation to check blind spot pain free to the R in 8 visits  3.  Pt to report centralization of symptoms from traps and mobs to the cervical spine and thoracic spine  Mult bouts of grade 2-3    Thoracic ext over foam roller 3 mins   Mechanical traction 15 mins 20-15 #    1 # prone rows  0# scap and retraction 10 x   Standing 1 # flex and scap 10 x with cervical re taking her anti inflamm meds as prescribed by MD after her session today. Advised to cont with her medial nerve glides and stretches at home. Dull ache in the lateral arm seemed to linger duration of the session.  Response to traction unusual today most of form    STM R upper trap PA glides to cervical spine mult bouts grade 2-3   10 mins   Mechanical traction 15 mins   20-15# with heat       Rows red band   10 x     Flex and scap with slow tempo 2 # 10 x each     Prone rows and scap retractions 10 x     Man reduction of symptoms today in prone  Reflexes normal on the R. Strength 5/5 on the right shoulder flex/abd and the bicep/tricep    Assessment:    Had to stop traction today due to pain in the lateral right arm today increasing.  Pt felt better prior to tra side  Prone rows and ext 10 x 0# ROWs green band 10 x 2   Median nerve 10 x 2   Head away from the R arm  10 x  2 sets   Red band flex with band  Red band ext 10 x     Wall push ups 10 x 2  ER/IR green band 10 x each  Red for ER     Flex and scap 2 # 10 x Subjective: back from my work trip. My lateral arm was so bad most of the week on my work trip. Today in the lateral right arm, tingling into my finger on the R  Objective:    + R upper trap and lateral scap border with STM.  PA Glides to R cervical sp blade  PA glides to the cervical spine grade 2-3 mult bouts of uni  Attempted CT junction manipulation   Supine: manual cervical traction  Medial nerve glide 10 x R STM upper traps B   PA glides to cervical spine grade 2-3 mult bouts   Thoracic mobs   10 m Skilled manual PT today -  Issued specific exercises to pt for strengthening   Charges:   EX 2 MT 1      Total Timed Treatment: 45 min  Total Treatment Time: 45 min

## 2019-04-23 ENCOUNTER — OFFICE VISIT (OUTPATIENT)
Dept: PHYSICAL THERAPY | Age: 39
End: 2019-04-23
Attending: FAMILY MEDICINE
Payer: COMMERCIAL

## 2019-04-23 PROCEDURE — 97140 MANUAL THERAPY 1/> REGIONS: CPT

## 2019-04-23 PROCEDURE — 97110 THERAPEUTIC EXERCISES: CPT

## 2019-04-23 NOTE — PROGRESS NOTES
Dx:  Cervical pain with R radiculopathy     Authorized # of Visits:  12       Next MD visit: none scheduled  Fall Risk: standard         Precautions: n/a            Subjective:    Having injection this Thursday. My HA was better over the weekend.  Driving i 30 secs    Foam roller pec stretch and reach ups and downs 10 x each  Re-asssessment today 5 mins     PA glides to the cervical and thoracic spine grade 2-3 mult bouts of 20 secs    STM to the upper traps and cervical paraspinals occipital release 15 mins Passive distraction with seated active rotation by pt  3 x each side  Prone rows and ext 10 x 0# ROWs green band 10 x 2   Median nerve 10 x 2   Head away from the R arm  10 x  2 sets   Red band flex with band  Red band ext 10 x     Wall push ups 10 x 2 10 mins                                                                 Skilled Services:  Skilled manual PT today -  Issued specific exercises to pt for strengthening   Charges:   EX 2 MT 1      Total Timed Treatment: 45 min  Total Treatment Time: 45 min TX#: 8/10  3/19/19     UBE 1 mins fwd  1 min bkwd UBE 1 mins fwd  1 min bkwd UBE 1 min in each direction  UBE 1 min fwd  1 min bkwd UBE 2 mins fwd  2 mins bkwd UBE 2 mins fwd  2 mins bkwd Prone STM to the Upper R trap   PA ru cervical spine gr band 10 x each   Standing flex and scap to 90 degs 2# 10 x     mech traction 15 mins 20-10#  MT: prone thoracic mobs  Cervical mobs grade 2-3 mult bouts uni only   STM R upper trap 10 mins     Mechanical traction 15 mins 20-15# with MHP        Ext over foa with PT once she returns from her work trip.    Date: 2/15/2019 TX#: 2 10  Date:               TX#: 3/10   Date:               TX#: 4/10  2/21/19 Date:               TX#: 5/10  2.26.19 Date:               TX#: 6/10  3/5/19 Date:               TX#: 7/10  3/8 mins 20-15# with MHP  Standing rows green band 10 x 2     Roller :  pec stretch 3 x 30 secs  Reach up and down 10 x   Alt long UE stretch 10 x   Traction 5 mins stopped due to increase in symptoms   Rows red band   10 x    Rows with green band and shoulder blind spot pain free to the R in 8 visits  3. Pt to report centralization of symptoms from the R into the cervical spine 2 weeks  4. Increase R  strength to stop dropping objects and hold objects  5.  Increase LT one grade to improve posture       Plan: prone rows  0# scap and retraction 10 x   Standing 1 # flex and scap 10 x with cervical retraction     Roller thoracic ext 10 x   STM L upper trap and PA glides to cervical spine mult bouts of grade 2-3 uni L and right  Occipital release 4 mins   Mechanica lateral arm seemed to linger duration of the session. Response to traction unusual today most of the time it does help alleviate symptoms. No changes in strength or reflexes on the right   Goals:   1.  Pt to be I with HEP  2. Pt to demonstrate 80 degs of ro and scap with slow tempo 2 # 10 x each     Prone rows and scap retractions 10 x     Manual PT to the upper traps and mobs to the cervical spine and thoracic spine  Mult bouts of grade 2-3    Thoracic ext over foam roller 3 mins   Mechanical traction 15 min due to pain in the lateral right arm today increasing. Pt felt better prior to traction. She was planning on taking her anti inflamm meds as prescribed by MD after her session today. Advised to cont with her medial nerve glides and stretches at home.  Leighton [place] flex with band  Red band ext 10 x     Wall push ups 10 x 2  ER/IR green band 10 x each  Red for ER     Flex and scap 2 # 10 x each with cues for form    STM R upper trap PA glides to cervical spine mult bouts grade 2-3   10 mins   Mechanical traction 15 mi Objective:   + R moderate restrictions with PA glides grade 2-3. + cervical paraspinals with palpation and STM. Strength on the R UE 5/5   reflexes normal. - tello's R    Assessment:        No pressure in her head post session.  Cupping helped with UT 2-3 mult bouts  CT junction manipulation 1 x   15 mins    Thoracic ext over the foam roller 5 x   Occipital release with tennis ball 5 mins  MHP 10 mins cervical spine  UBE to warm up 3 mins     Prone rows and scap with retractions 10 x each no weights symptoms STM B upper traps   UT and levator stretch 3 x 30 secs    Education and use of the home cervical traction unit 10 mins  Foam roller thoracic mobs and stretch 4 mins     Rows and scap retraction in prone 10 x each 2#     estim with MHP R upper trap symptoms. No changes in strength or reflexes on the right   Goals:   1. Pt to be I with HEP  2. Pt to demonstrate 80 degs of rotation to check blind spot pain free to the R in 8 visits  3.  Pt to report centralization of symptoms from the R into the cervica cervical spine and thoracic spine  Mult bouts of grade 2-3    Thoracic ext over foam roller 3 mins   Mechanical traction 15 mins 20-15 #    1 # prone rows  0# scap and retraction 10 x   Standing 1 # flex and scap 10 x with cervical retraction     Roller th inflamm meds as prescribed by MD after her session today. Advised to cont with her medial nerve glides and stretches at home. Dull ache in the lateral arm seemed to linger duration of the session.  Response to traction unusual today most of the time it does PA glides to cervical spine mult bouts grade 2-3   10 mins   Mechanical traction 15 mins   20-15# with heat       Rows red band   10 x     Flex and scap with slow tempo 2 # 10 x each     Prone rows and scap retractions 10 x     Manual PT to the upper traps prone  Reflexes normal on the R. Strength 5/5 on the right shoulder flex/abd and the bicep/tricep    Assessment:    Had to stop traction today due to pain in the lateral right arm today increasing. Pt felt better prior to traction.  She was planning on taki ROWs green band 10 x 2   Median nerve 10 x 2   Head away from the R arm  10 x  2 sets   Red band flex with band  Red band ext 10 x     Wall push ups 10 x 2  ER/IR green band 10 x each  Red for ER     Flex and scap 2 # 10 x each with cues for form    STM R trip.  My lateral arm was so bad most of the week on my work trip. Today in the lateral right arm, tingling into my finger on the R  Objective:    + R upper trap and lateral scap border with STM.  PA Glides to R cervical spine did help with reduction of sym junction manipulation   Supine: manual cervical traction  Medial nerve glide 10 x R STM upper traps B   PA glides to cervical spine grade 2-3 mult bouts   Thoracic mobs   10 mins   Passive distraction with seated active rotation by pt  3 x each side  Prone EX 2 MT 1      Total Timed Treatment: 45 min  Total Treatment Time: 45 min    Dx:  Cervical pain with R radiculopathy     Authorized # of Visits:  12       Next MD visit: none scheduled  Fall Risk: standard         Precautions: n/a             Subjective: bkwd UBE 2 mins fwd  2 mins bkwd Prone STM to the Upper R trap   PA glides cervical spine grade 2-3 mult bouts uni   10 mins     Rows and scap retractions 10 in prone      UBE 2 mins fwd  2 mins bkwd       STM R upper trap and medial blade  PA glides to th grade 2-3 mult bouts uni only   STM R upper trap 10 mins     Mechanical traction 15 mins 20-15# with MHP         Ext over foam roller                    estim with MHP 10 mins                                         Skilled Services:  Skilled manual PT tod

## 2019-04-24 ENCOUNTER — APPOINTMENT (OUTPATIENT)
Dept: PHYSICAL THERAPY | Age: 39
End: 2019-04-24
Attending: FAMILY MEDICINE
Payer: COMMERCIAL

## 2019-05-07 ENCOUNTER — OFFICE VISIT (OUTPATIENT)
Dept: PHYSICAL THERAPY | Age: 39
End: 2019-05-07
Attending: FAMILY MEDICINE
Payer: COMMERCIAL

## 2019-05-07 PROCEDURE — 97110 THERAPEUTIC EXERCISES: CPT

## 2019-05-08 NOTE — PROGRESS NOTES
Dx:  Cervical pain with R radiculopathy     Authorized # of Visits:  12       Next MD visit: none scheduled  Fall Risk: standard         Precautions: n/a            Subjective:     Back from a week of traveling for work.  The injection did help me not have retractions 10 in prone      UBE to warm up back and forward 2 mins each direction    PA glides cervical spine and thoracic spine grade 2-3 mult bouts 30 secs    Foam roller pec stretch and reach ups and downs 10 x each  Re-asssessment today 5 mins     PA 15 mins     Occipital release 8 mins     Prone rows and MT/retractions 10 x each   CP 10 mins occipital region    STM R upper trap and medial blade  PA glides to the cervical spine grade 2-3 mult bouts of uni  Attempted CT junction manipulation   Supine: m manipulation x 1        Rows red band   10 x    Rows with green band and shoulder ext green band 10 x each   Standing flex and scap to 90 degs 2# 10 x     mech traction 15 mins 20-10#  MT: prone thoracic mobs  Cervical mobs grade 2-3 mult bouts uni only weeks  4. Increase R  strength to stop dropping objects and hold objects  5. Increase LT one grade to improve posture       Plan:   Pt to cont with PT once she returns from her work trip.    Date: 2/15/2019 TX#: 2 10  Date:               TX#: 3/10   Patrick 10 x   STM L upper trap and PA glides to cervical spine mult bouts of grade 2-3 uni L and right  Occipital release 4 mins   Mechanical traction 15 mins 20-15# with MHP  Standing rows green band 10 x 2     Roller :  pec stretch 3 x 30 secs  Reach up and prerna symptoms. No changes in strength or reflexes on the right   Goals:   1. Pt to be I with HEP  2. Pt to demonstrate 80 degs of rotation to check blind spot pain free to the R in 8 visits  3.  Pt to report centralization of symptoms from the R into the cervica cervical spine and thoracic spine  Mult bouts of grade 2-3    Thoracic ext over foam roller 3 mins   Mechanical traction 15 mins 20-15 #    1 # prone rows  0# scap and retraction 10 x   Standing 1 # flex and scap 10 x with cervical retraction     Roller th inflamm meds as prescribed by MD after her session today. Advised to cont with her medial nerve glides and stretches at home. Dull ache in the lateral arm seemed to linger duration of the session.  Response to traction unusual today most of the time it does PA glides to cervical spine mult bouts grade 2-3   10 mins   Mechanical traction 15 mins   20-15# with heat       Rows red band   10 x     Flex and scap with slow tempo 2 # 10 x each     Prone rows and scap retractions 10 x     Manual PT to the upper traps prone  Reflexes normal on the R. Strength 5/5 on the right shoulder flex/abd and the bicep/tricep    Assessment:    Had to stop traction today due to pain in the lateral right arm today increasing. Pt felt better prior to traction.  She was planning on taki ROWs green band 10 x 2   Median nerve 10 x 2   Head away from the R arm  10 x  2 sets   Red band flex with band  Red band ext 10 x     Wall push ups 10 x 2  ER/IR green band 10 x each  Red for ER     Flex and scap 2 # 10 x each with cues for form    STM R trip.  My lateral arm was so bad most of the week on my work trip. Today in the lateral right arm, tingling into my finger on the R  Objective:    + R upper trap and lateral scap border with STM.  PA Glides to R cervical spine did help with reduction of sym grade 2-3 mult bouts of uni  Attempted CT junction manipulation   Supine: manual cervical traction  Medial nerve glide 10 x R STM upper traps B   PA glides to cervical spine grade 2-3 mult bouts   Thoracic mobs   10 mins   Passive distraction with seated a specific exercises to pt for strengthening   Charges:   EX 2 MT 1      Total Timed Treatment: 45 min  Total Treatment Time: 45 min    Dx:  Cervical pain with R radiculopathy     Authorized # of Visits:  12       Next MD visit: none scheduled  Fall Risk: st direction    PA glides cervical spine and thoracic spine grade 2-3 mult bouts 30 secs    Foam roller pec stretch and reach ups and downs 10 x each  Re-asssessment today 5 mins     PA glides to the cervical and thoracic spine grade 2-3 mult bouts of 20 secs Standing 1 # flex and scap 10 x with cervical retraction     Roller thoracic ext 10 x   STM L upper trap and PA glides to cervical spine mult bouts of grade 2-3 uni L and right  Occipital release 4 mins   Mechanical traction 15 mins 20-15# with MHP  Arya stop traction today due to pain in the lateral right arm today increasing. Pt felt better prior to traction. She was planning on taking her anti inflamm meds as prescribed by MD after her session today.  Advised to cont with her medial nerve glides and stre ext 10 x     Wall push ups 10 x 2  ER/IR green band 10 x each  Red for ER     Flex and scap 2 # 10 x each with cues for form    STM R upper trap PA glides to cervical spine mult bouts grade 2-3   10 mins   Mechanical traction 15 mins   20-15# with heat the R  Objective:    + R upper trap and lateral scap border with STM.  PA Glides to R cervical spine did help with reduction of symptoms today in prone  Reflexes normal on the R. Strength 5/5 on the right shoulder flex/abd and the bicep/tricep    Assessment grade 2-3 mult bouts   Thoracic mobs   10 mins   Passive distraction with seated active rotation by pt  3 x each side  Prone rows and ext 10 x 0# ROWs green band 10 x 2   Median nerve 10 x 2   Head away from the R arm  10 x  2 sets   Red band flex with ban # of Visits:  12       Next MD visit: none scheduled  Fall Risk: standard         Precautions: n/a             Subjective: back from my work trip. My lateral arm was so bad most of the week on my work trip.  Today in the lateral right arm, tingling into my retractions 10 in prone        STM R upper trap and medial blade  PA glides to the cervical spine grade 2-3 mult bouts of uni  Attempted CT junction manipulation   Supine: manual cervical traction  Medial nerve glide 10 x R STM upper traps B   PA glides to Skilled Services:  Skilled manual PT today -  Issued specific exercises to pt for strengthening   Charges:   EX 2 MT 1      Total Timed Treatment: 45 min  Total Treatment Time: 45 min    Dx:  Cervical pain with R radiculopathy mins fwd  1 min bkwd UBE 1 mins fwd  1 min bkwd UBE 1 min in each direction  UBE 1 min fwd  1 min bkwd UBE 2 mins fwd  2 mins bkwd UBE 2 mins fwd  2 mins bkwd Prone STM to the Upper R trap   CHASITY vences cervical spine grade 2-3 mult bouts uni   10 mins     R to 90 degs 2# 10 x     mech traction 15 mins 20-10#  MT: prone thoracic mobs  Cervical mobs grade 2-3 mult bouts uni only   STM R upper trap 10 mins     Mechanical traction 15 mins 20-15# with MHP        Ext over foam roller                  estim with MHP trip.   Date: 2/15/2019 TX#: 2 10  Date:               TX#: 3/10   Date:               TX#: 4/10  2/21/19 Date:               TX#: 5/10  2.26.19 Date:               TX#: 6/10  3/5/19 Date:               TX#: 7/10  3/8/19 Date:               TX#: 8/10  3/19 traction 15 mins 20-15# with MHP  Standing rows green band 10 x 2     Roller :  pec stretch 3 x 30 secs  Reach up and down 10 x   Alt long UE stretch 10 x   Traction 5 mins stopped due to increase in symptoms    Rows red band   10 x    Rows with green band

## 2019-05-10 ENCOUNTER — APPOINTMENT (OUTPATIENT)
Dept: PHYSICAL THERAPY | Age: 39
End: 2019-05-10
Attending: FAMILY MEDICINE
Payer: COMMERCIAL

## 2019-05-13 DIAGNOSIS — F51.01 PRIMARY INSOMNIA: ICD-10-CM

## 2019-05-13 RX ORDER — TRAZODONE HYDROCHLORIDE 50 MG/1
TABLET ORAL
Qty: 90 TABLET | Refills: 1 | Status: SHIPPED | OUTPATIENT
Start: 2019-05-13 | End: 2019-12-02

## 2019-05-22 NOTE — TELEPHONE ENCOUNTER
LV 2-11-19  LR 2-14-19    Patient needs updated CMP or BMP.  Ok to refill or do you want patient to get updated labs done first?

## 2019-05-30 ENCOUNTER — TELEPHONE (OUTPATIENT)
Dept: FAMILY MEDICINE CLINIC | Facility: CLINIC | Age: 39
End: 2019-05-30

## 2019-05-30 DIAGNOSIS — E55.9 HYPOVITAMINOSIS D: Primary | ICD-10-CM

## 2019-05-30 DIAGNOSIS — E78.1 HYPERTRIGLYCERIDEMIA: ICD-10-CM

## 2019-05-30 NOTE — TELEPHONE ENCOUNTER
Pt was instructed to make an appt for a med refill and to do labs, pt is requesting to please put the orders on the system and call her back, pt then will make an appt once she does the labs. Please call and advise.

## 2019-07-04 ENCOUNTER — PATIENT MESSAGE (OUTPATIENT)
Dept: FAMILY MEDICINE CLINIC | Facility: CLINIC | Age: 39
End: 2019-07-04

## 2019-07-05 NOTE — TELEPHONE ENCOUNTER
From: Ruben Griffith  To: Bridget Lyons MD  Sent: 7/4/2019 12:27 PM CDT  Subject: Non-Urgent Medical Question    Dear Dr. Calvert Staff had ordered some tests for me. Where can I find the list of those tests?    I wanted to schedule the tests but rosina

## 2019-07-11 ENCOUNTER — APPOINTMENT (OUTPATIENT)
Dept: LAB | Age: 39
End: 2019-07-11
Attending: FAMILY MEDICINE
Payer: COMMERCIAL

## 2019-07-11 DIAGNOSIS — E78.1 HYPERTRIGLYCERIDEMIA: ICD-10-CM

## 2019-07-11 DIAGNOSIS — E55.9 HYPOVITAMINOSIS D: ICD-10-CM

## 2019-07-11 LAB
ALBUMIN SERPL-MCNC: 4 G/DL (ref 3.4–5)
ALBUMIN/GLOB SERPL: 1.1 {RATIO} (ref 1–2)
ALP LIVER SERPL-CCNC: 54 U/L (ref 37–98)
ALT SERPL-CCNC: 27 U/L (ref 13–56)
ANION GAP SERPL CALC-SCNC: 6 MMOL/L (ref 0–18)
AST SERPL-CCNC: 13 U/L (ref 15–37)
BILIRUB SERPL-MCNC: 0.4 MG/DL (ref 0.1–2)
BUN BLD-MCNC: 7 MG/DL (ref 7–18)
BUN/CREAT SERPL: 9.9 (ref 10–20)
CALCIUM BLD-MCNC: 9.3 MG/DL (ref 8.5–10.1)
CHLORIDE SERPL-SCNC: 107 MMOL/L (ref 98–112)
CHOLEST SMN-MCNC: 201 MG/DL (ref ?–200)
CO2 SERPL-SCNC: 25 MMOL/L (ref 21–32)
CREAT BLD-MCNC: 0.71 MG/DL (ref 0.55–1.02)
GLOBULIN PLAS-MCNC: 3.6 G/DL (ref 2.8–4.4)
GLUCOSE BLD-MCNC: 106 MG/DL (ref 70–99)
HDLC SERPL-MCNC: 44 MG/DL (ref 40–59)
LDLC SERPL CALC-MCNC: 116 MG/DL (ref ?–100)
M PROTEIN MFR SERPL ELPH: 7.6 G/DL (ref 6.4–8.2)
NONHDLC SERPL-MCNC: 157 MG/DL (ref ?–130)
OSMOLALITY SERPL CALC.SUM OF ELEC: 284 MOSM/KG (ref 275–295)
POTASSIUM SERPL-SCNC: 3.6 MMOL/L (ref 3.5–5.1)
SODIUM SERPL-SCNC: 138 MMOL/L (ref 136–145)
TRIGL SERPL-MCNC: 204 MG/DL (ref 30–149)
VIT D+METAB SERPL-MCNC: 35.3 NG/ML (ref 30–100)
VLDLC SERPL CALC-MCNC: 41 MG/DL (ref 0–30)

## 2019-07-11 PROCEDURE — 80061 LIPID PANEL: CPT | Performed by: FAMILY MEDICINE

## 2019-07-11 PROCEDURE — 80053 COMPREHEN METABOLIC PANEL: CPT | Performed by: FAMILY MEDICINE

## 2019-07-11 PROCEDURE — 36415 COLL VENOUS BLD VENIPUNCTURE: CPT | Performed by: FAMILY MEDICINE

## 2019-07-11 PROCEDURE — 82306 VITAMIN D 25 HYDROXY: CPT | Performed by: FAMILY MEDICINE

## 2019-07-17 ENCOUNTER — OFFICE VISIT (OUTPATIENT)
Dept: FAMILY MEDICINE CLINIC | Facility: CLINIC | Age: 39
End: 2019-07-17
Payer: COMMERCIAL

## 2019-07-17 VITALS
OXYGEN SATURATION: 98 % | HEART RATE: 73 BPM | RESPIRATION RATE: 18 BRPM | SYSTOLIC BLOOD PRESSURE: 110 MMHG | WEIGHT: 157 LBS | DIASTOLIC BLOOD PRESSURE: 70 MMHG | HEIGHT: 61.5 IN | BODY MASS INDEX: 29.26 KG/M2

## 2019-07-17 DIAGNOSIS — Z12.39 SCREENING FOR BREAST CANCER: ICD-10-CM

## 2019-07-17 DIAGNOSIS — M48.02 FORAMINAL STENOSIS OF CERVICAL REGION: ICD-10-CM

## 2019-07-17 DIAGNOSIS — M54.12 CERVICAL RADICULOPATHY AT C5: ICD-10-CM

## 2019-07-17 DIAGNOSIS — Z00.00 ROUTINE GENERAL MEDICAL EXAMINATION AT A HEALTH CARE FACILITY: ICD-10-CM

## 2019-07-17 DIAGNOSIS — E78.1 HYPERTRIGLYCERIDEMIA: Primary | ICD-10-CM

## 2019-07-17 DIAGNOSIS — M50.20 HERNIATED DISC, CERVICAL: ICD-10-CM

## 2019-07-17 PROCEDURE — 99396 PREV VISIT EST AGE 40-64: CPT | Performed by: FAMILY MEDICINE

## 2019-07-17 RX ORDER — CHLORAL HYDRATE 500 MG
2000 CAPSULE ORAL DAILY
Qty: 60 CAPSULE | Refills: 0 | COMMUNITY
Start: 2019-07-17

## 2019-07-17 NOTE — PROGRESS NOTES
HPI:  Here for a physical.    PAST MEDICAL HISTORY:  Past Medical History:   Diagnosis Date   • Herniated disc, cervical 5/18/2018    C5-6 to the right, C6-7 to the left.    • Insomnia      PAST SURGICAL HISTORY:  Past Surgical History:   Procedure Maybrigetten Dash use: No      Sexual activity: Not on file    Lifestyle      Physical activity:        Days per week: Not on file        Minutes per session: Not on file      Stress: Not on file    Relationships      Social connections:        Talks on phone: Not on file request of Dr. Ralph Connolly the pain clinic Dr. Cristel Miramontes: No complaints of  any new headaches or change in headache pattern. Having headaches secondary to neck nerve impingement  PSYCHE: No complaints of symptoms of depression or anxiety.   HEMATOLOGY: No compla edema.  NEUROLOGIC: CN's II-XII grossly intact, DTR's 2+/4+ throughout. Strength 5+/5+ x 4 ext. Alert and orientated. SKIN: no suspicions lesions noted. PSYCHIATRIC: Mood and affect appropriate.     ASSESSMENT / PLAN:  Routine health  maintenence  Mammogr

## 2019-07-26 ENCOUNTER — HOSPITAL ENCOUNTER (OUTPATIENT)
Dept: MRI IMAGING | Age: 39
Discharge: HOME | End: 2019-07-26
Attending: FAMILY MEDICINE
Payer: COMMERCIAL

## 2019-07-26 ENCOUNTER — HOSPITAL ENCOUNTER (OUTPATIENT)
Dept: MAMMOGRAPHY | Age: 39
Discharge: HOME | End: 2019-07-26
Attending: FAMILY MEDICINE
Payer: COMMERCIAL

## 2019-07-26 DIAGNOSIS — Z12.39 SCREENING FOR BREAST CANCER: ICD-10-CM

## 2019-07-26 DIAGNOSIS — M54.12 CERVICAL RADICULOPATHY AT C5: ICD-10-CM

## 2019-07-26 PROCEDURE — 77063 BREAST TOMOSYNTHESIS BI: CPT | Performed by: FAMILY MEDICINE

## 2019-07-26 PROCEDURE — 77067 SCR MAMMO BI INCL CAD: CPT | Performed by: FAMILY MEDICINE

## 2019-07-26 PROCEDURE — 72141 MRI NECK SPINE W/O DYE: CPT | Performed by: FAMILY MEDICINE

## 2019-10-28 RX ORDER — SPIRONOLACTONE 50 MG/1
TABLET, FILM COATED ORAL
Qty: 90 TABLET | Refills: 0 | Status: SHIPPED | OUTPATIENT
Start: 2019-10-28 | End: 2020-03-11 | Stop reason: ALTCHOICE

## 2019-12-02 DIAGNOSIS — F51.01 PRIMARY INSOMNIA: ICD-10-CM

## 2019-12-02 RX ORDER — TRAZODONE HYDROCHLORIDE 50 MG/1
TABLET ORAL
Qty: 90 TABLET | Refills: 0 | Status: SHIPPED | OUTPATIENT
Start: 2019-12-02 | End: 2020-02-26

## 2020-02-25 DIAGNOSIS — F51.01 PRIMARY INSOMNIA: ICD-10-CM

## 2020-02-26 RX ORDER — TRAZODONE HYDROCHLORIDE 50 MG/1
TABLET ORAL
Qty: 90 TABLET | Refills: 0 | Status: SHIPPED | OUTPATIENT
Start: 2020-02-26 | End: 2020-06-01

## 2020-03-11 ENCOUNTER — OFFICE VISIT (OUTPATIENT)
Dept: FAMILY MEDICINE CLINIC | Facility: CLINIC | Age: 40
End: 2020-03-11
Payer: COMMERCIAL

## 2020-03-11 VITALS
RESPIRATION RATE: 20 BRPM | HEART RATE: 84 BPM | HEIGHT: 63 IN | SYSTOLIC BLOOD PRESSURE: 122 MMHG | OXYGEN SATURATION: 98 % | WEIGHT: 162 LBS | DIASTOLIC BLOOD PRESSURE: 76 MMHG | TEMPERATURE: 99 F | BODY MASS INDEX: 28.7 KG/M2

## 2020-03-11 DIAGNOSIS — R05.9 COUGH: Primary | ICD-10-CM

## 2020-03-11 DIAGNOSIS — L73.9 FOLLICULITIS: ICD-10-CM

## 2020-03-11 PROCEDURE — 99213 OFFICE O/P EST LOW 20 MIN: CPT | Performed by: FAMILY MEDICINE

## 2020-03-11 RX ORDER — PREDNISONE 20 MG/1
TABLET ORAL
Qty: 13 TABLET | Refills: 0 | Status: SHIPPED | OUTPATIENT
Start: 2020-03-11 | End: 2020-03-24

## 2020-03-11 RX ORDER — DOXYCYCLINE HYCLATE 100 MG
100 TABLET ORAL 2 TIMES DAILY
Qty: 20 TABLET | Refills: 0 | Status: SHIPPED | OUTPATIENT
Start: 2020-03-11 | End: 2022-01-13 | Stop reason: ALTCHOICE

## 2020-03-11 RX ORDER — BENZONATATE 200 MG/1
200 CAPSULE ORAL EVERY 8 HOURS PRN
Qty: 30 CAPSULE | Refills: 0 | Status: SHIPPED | OUTPATIENT
Start: 2020-03-11 | End: 2020-03-30

## 2020-03-11 RX ORDER — CLINDAMYCIN PHOSPHATE 10 MG/G
1 GEL TOPICAL NIGHTLY
Qty: 1 TUBE | Refills: 1 | Status: SHIPPED | OUTPATIENT
Start: 2020-03-11 | End: 2021-03-06

## 2020-03-11 NOTE — PROGRESS NOTES
HPI:    Patient ID: Idania Delong is a 41year old female. Cough   This is a new problem. Episode onset: 4 weeks ago. Progression since onset: some improvement seen after first week. The problem occurs every few minutes.  The cough is productive of sputum appears well-developed and well-nourished. No distress. HENT:   Right Ear: External ear normal.   Left Ear: External ear normal.   Mouth/Throat: Oropharynx is clear and moist. No oropharyngeal exudate.    Ear exam normal  Throat exam normal   Eyes: Conjun Visit:  Requested Prescriptions     Signed Prescriptions Disp Refills   • predniSONE 20 MG Oral Tab 13 tablet 0     Si.5 tab day 1-3, 2 tab day 4, 1.5 tab day 5, 1 tab day 6, 0.5 tab day 7   • Doxycycline Hyclate 100 MG Oral Tab 20 tablet 0     Sig: Ta

## 2020-03-24 ENCOUNTER — TELEPHONE (OUTPATIENT)
Dept: FAMILY MEDICINE CLINIC | Facility: CLINIC | Age: 40
End: 2020-03-24

## 2020-03-24 DIAGNOSIS — R05.9 COUGH: ICD-10-CM

## 2020-03-24 DIAGNOSIS — R73.09 ELEVATED HEMOGLOBIN A1C: Primary | ICD-10-CM

## 2020-03-24 RX ORDER — PREDNISONE 20 MG/1
TABLET ORAL
Qty: 13 TABLET | Refills: 0 | Status: SHIPPED | OUTPATIENT
Start: 2020-03-24 | End: 2022-01-13 | Stop reason: ALTCHOICE

## 2020-03-24 NOTE — TELEPHONE ENCOUNTER
Do prednisone and OTC pepcid. Call again in 1 week if not better. Also have pt get A1C done, even though the prednisone may throw it off.

## 2020-03-24 NOTE — TELEPHONE ENCOUNTER
Pt finished prednisone 2 days, pt still with dry cough, seemed to get better with med, but restarted,  No fever, no other symptoms,     No recent travel    Pt states if YP thinks he needs tele visit he is okay to do one, otherwise he is requesting a refill

## 2020-03-30 ENCOUNTER — TELEPHONE (OUTPATIENT)
Dept: FAMILY MEDICINE CLINIC | Facility: CLINIC | Age: 40
End: 2020-03-30

## 2020-03-30 DIAGNOSIS — R05.9 COUGH: Primary | ICD-10-CM

## 2020-03-30 RX ORDER — CODEINE PHOSPHATE AND GUAIFENESIN 10; 100 MG/5ML; MG/5ML
5 SOLUTION ORAL EVERY 6 HOURS PRN
Qty: 200 ML | Refills: 0 | Status: SHIPPED | OUTPATIENT
Start: 2020-03-30 | End: 2022-01-13 | Stop reason: ALTCHOICE

## 2020-03-30 RX ORDER — BENZONATATE 200 MG/1
200 CAPSULE ORAL EVERY 8 HOURS PRN
Qty: 30 CAPSULE | Refills: 0 | Status: SHIPPED | OUTPATIENT
Start: 2020-03-30 | End: 2022-01-10 | Stop reason: ALTCHOICE

## 2020-03-30 NOTE — TELEPHONE ENCOUNTER
See previous message pt seen here 3/11/2020. Pt still with cough has not taken 2nd round of prednisone. Pt was feeling better but now cough has returned. Denies any other sx. Pt requesting med for cough prescribed. Please advise.

## 2020-03-30 NOTE — TELEPHONE ENCOUNTER
CALLING STATES PT WAS HERE TO SEE DR JUNIOR FOR COUGH. SHE DID NOT TAKE THE STEROID. DONE WITH COUGH MED AND NOW THE COUGH IS BACK.  WANTS TO KNOW IF WE CAN RE-RX THE COUGH MED FOR THE PATIENT? [facility] ON FILE.     PLS CALL BACK T

## 2020-05-31 DIAGNOSIS — F51.01 PRIMARY INSOMNIA: ICD-10-CM

## 2020-06-01 RX ORDER — TRAZODONE HYDROCHLORIDE 50 MG/1
TABLET ORAL
Qty: 90 TABLET | Refills: 0 | Status: SHIPPED | OUTPATIENT
Start: 2020-06-01 | End: 2020-08-26

## 2020-08-26 DIAGNOSIS — F51.01 PRIMARY INSOMNIA: ICD-10-CM

## 2020-08-26 RX ORDER — TRAZODONE HYDROCHLORIDE 50 MG/1
TABLET ORAL
Qty: 90 TABLET | Refills: 0 | Status: SHIPPED | OUTPATIENT
Start: 2020-08-26 | End: 2020-11-25

## 2020-08-26 NOTE — TELEPHONE ENCOUNTER
Rx Request  TRAZODONE HCL 50 MG Oral Tab      Disp: 90                   R: 0      Associated Dx: sleep    Last Visit:3/11/20    Last Refilled:6/1/20    Protocol Passed?  Shadi Guthrie

## 2020-11-25 DIAGNOSIS — F51.01 PRIMARY INSOMNIA: ICD-10-CM

## 2020-11-25 RX ORDER — TRAZODONE HYDROCHLORIDE 50 MG/1
50 TABLET ORAL NIGHTLY
Qty: 90 TABLET | Refills: 0 | Status: SHIPPED | OUTPATIENT
Start: 2020-11-25 | End: 2021-02-25

## 2021-02-25 DIAGNOSIS — F51.01 PRIMARY INSOMNIA: ICD-10-CM

## 2021-02-25 RX ORDER — TRAZODONE HYDROCHLORIDE 50 MG/1
50 TABLET ORAL NIGHTLY
Qty: 90 TABLET | Refills: 0 | Status: SHIPPED | OUTPATIENT
Start: 2021-02-25 | End: 2021-06-01

## 2021-05-30 DIAGNOSIS — F51.01 PRIMARY INSOMNIA: ICD-10-CM

## 2021-06-01 DIAGNOSIS — F51.01 PRIMARY INSOMNIA: ICD-10-CM

## 2021-06-01 RX ORDER — TRAZODONE HYDROCHLORIDE 50 MG/1
TABLET ORAL
Qty: 90 TABLET | Refills: 0 | OUTPATIENT
Start: 2021-06-01

## 2021-06-01 RX ORDER — TRAZODONE HYDROCHLORIDE 50 MG/1
50 TABLET ORAL NIGHTLY
Qty: 90 TABLET | Refills: 0 | OUTPATIENT
Start: 2021-06-01

## 2021-06-01 NOTE — PROGRESS NOTES
HPI/Subjective:   Patient ID: Puja Maier is a 35year old female. Tired  This is a chronic problem. The current episode started more than 1 year ago. The problem occurs constantly. The problem has been waxing and waning.  Associated symptoms include fa insomnia  (primary encounter diagnosis)    8 minute video visit    1. Primary insomnia  Discuss following up with sleep therapist to work on treatment that does not include medications. Medication is refilled for her usage.  Reminded that medication is not

## 2021-10-05 ENCOUNTER — OFFICE VISIT (OUTPATIENT)
Dept: FAMILY MEDICINE CLINIC | Facility: CLINIC | Age: 41
End: 2021-10-05
Payer: COMMERCIAL

## 2021-10-05 VITALS
HEART RATE: 90 BPM | SYSTOLIC BLOOD PRESSURE: 138 MMHG | HEIGHT: 63 IN | WEIGHT: 162 LBS | DIASTOLIC BLOOD PRESSURE: 82 MMHG | BODY MASS INDEX: 28.7 KG/M2 | RESPIRATION RATE: 20 BRPM | OXYGEN SATURATION: 95 %

## 2021-10-05 DIAGNOSIS — F51.01 PRIMARY INSOMNIA: ICD-10-CM

## 2021-10-05 DIAGNOSIS — M48.02 FORAMINAL STENOSIS OF CERVICAL REGION: Primary | ICD-10-CM

## 2021-10-05 DIAGNOSIS — L65.8 TRACTION ALOPECIA: ICD-10-CM

## 2021-10-05 DIAGNOSIS — M54.12 RADICULITIS OF RIGHT CERVICAL REGION: ICD-10-CM

## 2021-10-05 PROCEDURE — 3075F SYST BP GE 130 - 139MM HG: CPT | Performed by: FAMILY MEDICINE

## 2021-10-05 PROCEDURE — 3008F BODY MASS INDEX DOCD: CPT | Performed by: FAMILY MEDICINE

## 2021-10-05 PROCEDURE — 99214 OFFICE O/P EST MOD 30 MIN: CPT | Performed by: FAMILY MEDICINE

## 2021-10-05 PROCEDURE — 3079F DIAST BP 80-89 MM HG: CPT | Performed by: FAMILY MEDICINE

## 2021-10-05 RX ORDER — TRAZODONE HYDROCHLORIDE 50 MG/1
50 TABLET ORAL NIGHTLY
Qty: 90 TABLET | Refills: 1 | Status: SHIPPED | OUTPATIENT
Start: 2021-10-05

## 2021-10-05 RX ORDER — NAPROXEN 500 MG/1
500 TABLET ORAL 2 TIMES DAILY
Qty: 28 TABLET | Refills: 0 | Status: SHIPPED | OUTPATIENT
Start: 2021-10-05 | End: 2022-01-13 | Stop reason: ALTCHOICE

## 2021-10-05 NOTE — PROGRESS NOTES
Patient with known cervical disc disease at C5-C6 to the right and some arthritic change to the left based on MRIs in 2018 and 2019 presents with worsening neck pain with radiation into the arms. She feels the worst in fingers 4 and 3 over the right hand. Take 2,000 mg by mouth. • benzonatate 200 MG Oral Cap Take 1 capsule (200 mg total) by mouth every 8 (eight) hours as needed for cough.  (Patient not taking: Reported on 10/5/2021) 30 capsule 0   • guaiFENesin-codeine (CHERATUSSIN AC) 100-10 MG/5ML Oral alopecia  I have asked her to let her hair stay loose as much as possible.   I see no evidence of inflammation    If this note is coded by time based on the Office/Outpatient Evaluation and Management Codes effective January 1, 2021, the time includes triny

## 2021-11-08 ENCOUNTER — HOSPITAL ENCOUNTER (OUTPATIENT)
Age: 41
Discharge: HOME | End: 2021-11-08
Attending: EMERGENCY MEDICINE
Payer: COMMERCIAL

## 2021-11-08 VITALS
TEMPERATURE: 97 F | WEIGHT: 159 LBS | HEART RATE: 82 BPM | RESPIRATION RATE: 18 BRPM | DIASTOLIC BLOOD PRESSURE: 93 MMHG | OXYGEN SATURATION: 100 % | BODY MASS INDEX: 28 KG/M2 | SYSTOLIC BLOOD PRESSURE: 158 MMHG

## 2021-11-08 DIAGNOSIS — R13.19 ESOPHAGEAL DYSPHAGIA: Primary | ICD-10-CM

## 2021-11-08 PROCEDURE — 99213 OFFICE O/P EST LOW 20 MIN: CPT

## 2021-11-08 RX ORDER — PANTOPRAZOLE SODIUM 20 MG/1
20 TABLET, DELAYED RELEASE ORAL DAILY
Qty: 30 TABLET | Refills: 0 | Status: SHIPPED | OUTPATIENT
Start: 2021-11-08 | End: 2021-12-08

## 2021-11-08 NOTE — ED PROVIDER NOTES
Patient Seen in: [facility]      History   Patient presents with:  FB in Throat    Stated Complaint: Difficulty Swollowing  - Obstruction in throat/food pipe since Saturday morning.     Subjective:   HPI    57-year-old female presents to t CONTRAST, DX/THERAPEUTIC SUBSTANCE, EPIDURAL/SUBARACHNOID; CERVICAL/THORACIC N/A 5/2/2016    Procedure: CERVICAL EPIDURAL;  Surgeon: Ratna Cohen MD;  Location: [place] [address] History    Tobacco Use      Smoking sta with follow-up with GI. Imaging studies identical be helpful at this time. Patient may need endoscopy for further work-up. We will put her on a proton pump inhibitor for now.   Give her a GI referral.  Advised that if symptoms worsen it becomes more diff

## 2021-11-08 NOTE — ED INITIAL ASSESSMENT (HPI)
Saturday pt woke up with sensation of something stuck in the throat. Denies eating any food prior to having the sensation. She states she was feeling before going to bed. Pt states she felt something hard when she drinking. Denies any shortness of breath.

## 2021-11-29 ENCOUNTER — TELEPHONE (OUTPATIENT)
Dept: PHYSICAL THERAPY | Facility: HOSPITAL | Age: 41
End: 2021-11-29

## 2021-12-02 ENCOUNTER — OFFICE VISIT (OUTPATIENT)
Dept: PHYSICAL THERAPY | Age: 41
End: 2021-12-02
Attending: FAMILY MEDICINE
Payer: COMMERCIAL

## 2021-12-02 DIAGNOSIS — M48.02 FORAMINAL STENOSIS OF CERVICAL REGION: ICD-10-CM

## 2021-12-02 DIAGNOSIS — M54.12 RADICULITIS OF RIGHT CERVICAL REGION: ICD-10-CM

## 2021-12-02 PROCEDURE — 97161 PT EVAL LOW COMPLEX 20 MIN: CPT

## 2021-12-02 PROCEDURE — 97110 THERAPEUTIC EXERCISES: CPT

## 2021-12-02 NOTE — PROGRESS NOTES
SPINE EVALUATION:   Referring Physician: Dr. Flex Ortiz  Diagnosis: Foraminal stenosis of cervical region (M48.02)  Radiculitis of right cervical region (M54.12)     Date of Service: 12/2/2021     PATIENT SUMMARY   Agnel Mcwilliams is a 35year old female wh flexibility, ROM, joint mobility, muscle performance and motor function.   Functional deficits include but are not limited to difficulty with chin protraction at computer >30-40 min, driving >82 min, + sleep disturbance--has a hard time falling asleep due t provided recommendations for activity modifications, possible soreness after evaluation, postural corrections, ergonomics and importance of remaining active  Patient was instructed in and issued a HEP.  See AVS    Charges: PT Eval Low Complexity, Ex 1 therapist: Shay Villagomez, PT    [de-identified] certification required: Yes  I certify the need for these services furnished under this plan of treatment and while under my care.     X___________________________________________________ Date____________________

## 2021-12-06 ENCOUNTER — OFFICE VISIT (OUTPATIENT)
Dept: PHYSICAL THERAPY | Age: 41
End: 2021-12-06
Attending: FAMILY MEDICINE
Payer: COMMERCIAL

## 2021-12-06 PROCEDURE — 97110 THERAPEUTIC EXERCISES: CPT

## 2021-12-06 PROCEDURE — 97140 MANUAL THERAPY 1/> REGIONS: CPT

## 2021-12-06 PROCEDURE — 97014 ELECTRIC STIMULATION THERAPY: CPT

## 2021-12-06 NOTE — PROGRESS NOTES
Dx: Foraminal stenosis of cervical region (M48.02)  Radiculitis of right cervical region (M54.12)         Insurance (Authorized # of Visits):  PPO           Authorizing Physician: Dr. Kirby Sevilla  Next MD visit: none scheduled  Fall Risk: standard         Prec

## 2021-12-09 ENCOUNTER — OFFICE VISIT (OUTPATIENT)
Dept: PHYSICAL THERAPY | Age: 41
End: 2021-12-09
Attending: FAMILY MEDICINE
Payer: COMMERCIAL

## 2021-12-09 PROCEDURE — 97014 ELECTRIC STIMULATION THERAPY: CPT

## 2021-12-09 PROCEDURE — 97140 MANUAL THERAPY 1/> REGIONS: CPT

## 2021-12-09 PROCEDURE — 97110 THERAPEUTIC EXERCISES: CPT

## 2021-12-09 NOTE — PROGRESS NOTES
Dx: Foraminal stenosis of cervical region (M48.02)  Radiculitis of right cervical region (M54.12)         Insurance (Authorized # of Visits):  PPO           Authorizing Physician: Dr. Meghan Baer  Next MD visit: none scheduled  Fall Risk: standard         Prec right x10 Median nerve sliders right x10      Supine on FR: alt OH flexion, biat sh abd, bilat sh diag V, alt fwd reach x10 ea   Supine on FR: alt OH flexion, biat sh abd, bilat sh diag V, alt fwd reach x10 ea       Supine: manual distraction x15 min

## 2021-12-14 ENCOUNTER — OFFICE VISIT (OUTPATIENT)
Dept: PHYSICAL THERAPY | Age: 41
End: 2021-12-14
Attending: FAMILY MEDICINE
Payer: COMMERCIAL

## 2021-12-14 PROCEDURE — 97014 ELECTRIC STIMULATION THERAPY: CPT

## 2021-12-14 PROCEDURE — 97110 THERAPEUTIC EXERCISES: CPT

## 2021-12-14 PROCEDURE — 97140 MANUAL THERAPY 1/> REGIONS: CPT

## 2021-12-14 NOTE — PROGRESS NOTES
Dx: Foraminal stenosis of cervical region (M48.02)  Radiculitis of right cervical region (M54.12)         Insurance (Authorized # of Visits):  PPO           Authorizing Physician: Dr. Flor Francis  Next MD visit: none scheduled  Fall Risk: standard         Prec x10     RTB scapulalr rows x15 RTB scapulalr rows x20 GTB scapulalr rows x25       TRX; low rows x10     Median nerve sliders right x10 Median nerve sliders right x10 Median nerve sliders right x10     Supine on FR: alt OH flexion, biat sh abd, bilat sh di

## 2021-12-16 ENCOUNTER — OFFICE VISIT (OUTPATIENT)
Dept: PHYSICAL THERAPY | Age: 41
End: 2021-12-16
Attending: FAMILY MEDICINE
Payer: COMMERCIAL

## 2021-12-16 PROCEDURE — 97140 MANUAL THERAPY 1/> REGIONS: CPT

## 2021-12-16 PROCEDURE — 97110 THERAPEUTIC EXERCISES: CPT

## 2021-12-16 PROCEDURE — 97014 ELECTRIC STIMULATION THERAPY: CPT

## 2021-12-16 NOTE — PROGRESS NOTES
Dx: Foraminal stenosis of cervical region (M48.02)  Radiculitis of right cervical region (M54.12)         Insurance (Authorized # of Visits):  PPO           Authorizing Physician: Dr. Nessa Vega  Next MD visit: none scheduled  Fall Risk: standard         Prec sliders right x10 Median nerve sliders right x10 Median nerve sliders right x10    Supine on FR: alt OH flexion, biat sh abd, bilat sh diag V, alt fwd reach x10 ea   Supine on FR: alt OH flexion, biat sh abd, bilat sh diag V, alt fwd reach x10 ea Supine on

## 2021-12-20 ENCOUNTER — OFFICE VISIT (OUTPATIENT)
Dept: PHYSICAL THERAPY | Age: 41
End: 2021-12-20
Attending: FAMILY MEDICINE
Payer: COMMERCIAL

## 2021-12-20 PROCEDURE — 97110 THERAPEUTIC EXERCISES: CPT

## 2021-12-20 PROCEDURE — 97014 ELECTRIC STIMULATION THERAPY: CPT

## 2021-12-20 PROCEDURE — 97140 MANUAL THERAPY 1/> REGIONS: CPT

## 2021-12-20 NOTE — PROGRESS NOTES
Dx: Foraminal stenosis of cervical region (M48.02)  Radiculitis of right cervical region (M54.12)         Insurance (Authorized # of Visits):  PPO           Authorizing Physician: Dr. Nessa Vega  Next MD visit: none scheduled  Fall Risk: standard         Prec with arm slides x10   RTB scapulalr rows x15 RTB scapulalr rows x20 GTB scapulalr rows x25 GTB scapulalr rows x25 GTB scapulalr rows x25     TRX; low rows x10 TRX; low rows x12 TRX:   low rows x12  High rows x12   Median nerve sliders right x10 Median nerv

## 2021-12-22 ENCOUNTER — OFFICE VISIT (OUTPATIENT)
Dept: PHYSICAL THERAPY | Age: 41
End: 2021-12-22
Attending: FAMILY MEDICINE
Payer: COMMERCIAL

## 2021-12-22 PROCEDURE — 97110 THERAPEUTIC EXERCISES: CPT

## 2021-12-22 PROCEDURE — 97014 ELECTRIC STIMULATION THERAPY: CPT

## 2021-12-22 PROCEDURE — 97140 MANUAL THERAPY 1/> REGIONS: CPT

## 2021-12-22 NOTE — PROGRESS NOTES
Dx: Foraminal stenosis of cervical region (M48.02)  Radiculitis of right cervical region (M54.12)         Insurance (Authorized # of Visits):  PPO           Authorizing Physician: Dr. Aris Goff  Next MD visit: none scheduled  Fall Risk: standard         Prec with arm slides x10 Back to wall: posture correction with arm slides 3x5   RTB scapulalr rows x15 RTB scapulalr rows x20 GTB scapulalr rows x25 GTB scapulalr rows x25 GTB scapulalr rows x25 GTB scapulalr rows x25  GTB high rows x25        YTB bilat sh ER x with MHP x15 min Estim/IFC  Hz with MHP x15 min   HEP: 12/6/2021  Median nerve sliders, pelvic tilt, LTR    Charges: Ex 2 MT 1 Estim 1       Total Timed Treatment: 40 min  Total Treatment Time: 55 min

## 2022-01-05 ENCOUNTER — OFFICE VISIT (OUTPATIENT)
Dept: PHYSICAL THERAPY | Age: 42
End: 2022-01-05
Attending: FAMILY MEDICINE
Payer: COMMERCIAL

## 2022-01-05 PROCEDURE — 97140 MANUAL THERAPY 1/> REGIONS: CPT

## 2022-01-05 PROCEDURE — 97014 ELECTRIC STIMULATION THERAPY: CPT

## 2022-01-05 PROCEDURE — 97110 THERAPEUTIC EXERCISES: CPT

## 2022-01-05 NOTE — PROGRESS NOTES
Dx: Foraminal stenosis of cervical region (M48.02)  Radiculitis of right cervical region (M54.12)         Insurance (Authorized # of Visits):  PPO           Authorizing Physician: Dr. Kervin Preciado  Next MD visit: none scheduled  Fall Risk: standard         Prec pectoral stretch 3x10 sec UBE x5 min retro  Doorway pectoral stretch 3x10 sec UBE x5 min retro  Doorway pectoral stretch 3x10 sec   Wall press ups x10 Wall press ups x10 Wall press ups with plus x15 Wall press ups with plus x15 Wall press ups with plus on diag V, alt fwd reach, bilat HBH sh ER/ADD x10 ea  Supine on FR: DNF 10x10 sec     Self thoracic extension on FR 2x10 Self thoracic extension on FR 2x10 Self thoracic extension on FR 2x10 -- Self thoracic extension on FR 2x10     Prone on bed: bilat scapul

## 2022-01-06 ENCOUNTER — OFFICE VISIT (OUTPATIENT)
Dept: PHYSICAL THERAPY | Age: 42
End: 2022-01-06
Attending: FAMILY MEDICINE
Payer: COMMERCIAL

## 2022-01-06 PROCEDURE — 97032 APPL MODALITY 1+ESTIM EA 15: CPT

## 2022-01-06 PROCEDURE — 97110 THERAPEUTIC EXERCISES: CPT

## 2022-01-06 PROCEDURE — 97140 MANUAL THERAPY 1/> REGIONS: CPT

## 2022-01-06 NOTE — PROGRESS NOTES
Dx: Foraminal stenosis of cervical region (M48.02)  Radiculitis of right cervical region (M54.12)         Insurance (Authorized # of Visits):  PPO           Authorizing Physician: Dr. Michelle Vidales  Next MD visit: none scheduled  Fall Risk: standard         Prec plus on YSB x20 Wall press ups with plus on YSB x20  YSB roll ball up wall x10   Back to wall: posture correction with arm slides x10 Back to wall: posture correction with arm slides x10 Back to wall: posture correction with arm slides 3x5 Back to wall: po distraction x10 min   Estim/IFC  Hz x15 min with MHP x15 min Estim/IFC  Hz with MHP x15 min Estim/IFC  Hz with MHP x15 min Estim/IFC  Hz with MHP x15 min Estim/IFC  Hz with MHP x15 min   HEP: 12/6/2021  Median nerve sliders, p

## 2022-01-11 ENCOUNTER — OFFICE VISIT (OUTPATIENT)
Dept: PHYSICAL THERAPY | Age: 42
End: 2022-01-11
Attending: FAMILY MEDICINE
Payer: COMMERCIAL

## 2022-01-11 PROCEDURE — 97014 ELECTRIC STIMULATION THERAPY: CPT

## 2022-01-11 PROCEDURE — 97140 MANUAL THERAPY 1/> REGIONS: CPT

## 2022-01-11 PROCEDURE — 97110 THERAPEUTIC EXERCISES: CPT

## 2022-01-11 NOTE — PROGRESS NOTES
Dx: Foraminal stenosis of cervical region (M48.02)  Radiculitis of right cervical region (M54.12)         Insurance (Authorized # of Visits):  PPO           Authorizing Physician: Dr. Onel Garcia MD visit: none scheduled  Fall Risk: standard         Prec computer work. In progress   · Pt will be independent and compliant with comprehensive HEP to maintain progress achieved in PT.  In Progress      Rehab Potential: good    Plan: Continue skilled Physical Therapy POC 2 x/week or a total of 8 more visits over roll ball up wall x10   Back to wall: posture correction with arm slides x10 Back to wall: posture correction with arm slides x10 Back to wall: posture correction with arm slides 3x5 Back to wall: posture correction with arm slides 3x5 Back to wall: postur min total  -- Re-assess   Supine: manual distraction x15 min Supine: manual distraction x15 min Supine: manual distraction x10 min Supine: manual distraction x10 min Supine: manual distraction x10 min Supine: manual distraction x10 min   Estim/IFC  H

## 2022-01-12 ENCOUNTER — OFFICE VISIT (OUTPATIENT)
Dept: PHYSICAL THERAPY | Age: 42
End: 2022-01-12
Attending: FAMILY MEDICINE
Payer: COMMERCIAL

## 2022-01-12 PROCEDURE — 97110 THERAPEUTIC EXERCISES: CPT

## 2022-01-12 PROCEDURE — 97140 MANUAL THERAPY 1/> REGIONS: CPT

## 2022-01-12 PROCEDURE — 97014 ELECTRIC STIMULATION THERAPY: CPT

## 2022-01-12 NOTE — PROGRESS NOTES
Dx: Foraminal stenosis of cervical region (M48.02)  Radiculitis of right cervical region (M54.12)         Insurance (Authorized # of Visits):  PPO           Authorizing Physician: Dr. Scott Garcia MD visit: none scheduled  Fall Risk: standard         Prec correction with arm slides x10 Back to wall: posture correction with arm slides 3x5 Back to wall: posture correction with arm slides 3x5 Back to wall: posture correction with arm slides 3x5 Back to wall: posture correction with arm slides 3x5 Back to wall: thoracic extension on FR 2x10 Self thoracic extension on FR 2x10  Self thoracic extension on FR 2x10   --  Prone: thoracic PA mobilizations Gr 3-4 x4 min total Prone: thoracic PA mobilizations Gr 3-4 x4 min total Prone: thoracic PA mobilizations Gr 3-4 x4

## 2022-01-13 ENCOUNTER — OFFICE VISIT (OUTPATIENT)
Dept: FAMILY MEDICINE CLINIC | Facility: CLINIC | Age: 42
End: 2022-01-13
Payer: COMMERCIAL

## 2022-01-13 VITALS
DIASTOLIC BLOOD PRESSURE: 80 MMHG | HEIGHT: 63 IN | OXYGEN SATURATION: 98 % | WEIGHT: 161.38 LBS | RESPIRATION RATE: 18 BRPM | SYSTOLIC BLOOD PRESSURE: 130 MMHG | HEART RATE: 100 BPM | BODY MASS INDEX: 28.59 KG/M2

## 2022-01-13 DIAGNOSIS — S76.312A STRAIN OF HAMSTRING MUSCLE, LEFT, INITIAL ENCOUNTER: Primary | ICD-10-CM

## 2022-01-13 PROCEDURE — 3075F SYST BP GE 130 - 139MM HG: CPT | Performed by: FAMILY MEDICINE

## 2022-01-13 PROCEDURE — 99213 OFFICE O/P EST LOW 20 MIN: CPT | Performed by: FAMILY MEDICINE

## 2022-01-13 PROCEDURE — 3079F DIAST BP 80-89 MM HG: CPT | Performed by: FAMILY MEDICINE

## 2022-01-13 PROCEDURE — 3008F BODY MASS INDEX DOCD: CPT | Performed by: FAMILY MEDICINE

## 2022-01-13 RX ORDER — CYCLOBENZAPRINE HCL 5 MG
5 TABLET ORAL 3 TIMES DAILY
Qty: 20 TABLET | Refills: 0 | Status: SHIPPED | OUTPATIENT
Start: 2022-01-13 | End: 2022-01-20

## 2022-01-13 NOTE — PROGRESS NOTES
6 weeks of a pain very localized in the posterior proximal left hamstring. Woke up with it 1 morning. Does not remember any trauma. May radiate slightly laterally but not up and down the leg. Denies back pain. No numbness or tingling.   No swelling of UNIT/SPRAY Oral Liquid Take 2,000 mg by mouth. ALLERGIES:   Patient has no known allergies.   FAMILY HISTORY  Family History   Problem Relation Age of Onset   • Diabetes Father    • Hypertension Father    • Heart Disorder Father 70   • Diabetes Mother

## 2022-01-18 ENCOUNTER — OFFICE VISIT (OUTPATIENT)
Dept: PHYSICAL THERAPY | Age: 42
End: 2022-01-18
Attending: FAMILY MEDICINE
Payer: COMMERCIAL

## 2022-01-18 PROCEDURE — 97140 MANUAL THERAPY 1/> REGIONS: CPT

## 2022-01-18 PROCEDURE — 97110 THERAPEUTIC EXERCISES: CPT

## 2022-01-18 PROCEDURE — 97014 ELECTRIC STIMULATION THERAPY: CPT

## 2022-01-18 NOTE — PROGRESS NOTES
Dx: Foraminal stenosis of cervical region (M48.02)  Radiculitis of right cervical region (M54.12)         Insurance (Authorized # of Visits):  PPO           Authorizing Physician: Dr. Helder Calderon  Next MD visit: none scheduled  Fall Risk: standard         Prec ball up wall x10 Wall press ups with plus on YSB x20   Back to wall: posture correction with arm slides 3x5 Back to wall: posture correction with arm slides 3x5 Back to wall: posture correction with arm slides 3x5 Back to wall: posture correction with arm pelvic tilt, LTR    Charges: Ex 1 MT 1 Estim 1       Total Timed Treatment: 35 min  Total Treatment Time: 50 min

## 2022-01-19 ENCOUNTER — APPOINTMENT (OUTPATIENT)
Dept: PHYSICAL THERAPY | Age: 42
End: 2022-01-19
Attending: FAMILY MEDICINE
Payer: COMMERCIAL

## 2022-01-19 ENCOUNTER — OFFICE VISIT (OUTPATIENT)
Dept: PHYSICAL THERAPY | Age: 42
End: 2022-01-19
Attending: FAMILY MEDICINE
Payer: COMMERCIAL

## 2022-01-19 DIAGNOSIS — S76.312A STRAIN OF HAMSTRING MUSCLE, LEFT, INITIAL ENCOUNTER: ICD-10-CM

## 2022-01-19 PROCEDURE — 97161 PT EVAL LOW COMPLEX 20 MIN: CPT

## 2022-01-19 PROCEDURE — 97110 THERAPEUTIC EXERCISES: CPT

## 2022-01-19 NOTE — PROGRESS NOTES
SPINE EVALUATION:   Referring Physician: Dr. Osvaldo Coppola  Diagnosis: Strain of hamstring muscle, left, initial encounter (I88.950Y)      Rehab dx: lower lumbar facet joint arthropathy Date of Service: 1/19/2022     PATIENT SUMMARY   Buster No is a 35 y to pain . Hamstring integrity WNL and no pain reproduced or increased with resistance testing. Left thigh pain was intensified with PA mobilization testing of L3-4/5.  Signs and symptoms are consistent with diagnosis of lower lumbar spine facet joint arthr importance of remaining active  Patient was instructed in and issued a HEP.  See AVS    Charges: PT Eval Low Complexity, Ex 1      Total Timed Treatment: 15 min     Total Treatment Time: 60 min     Based on clinical rationale and outcome measures, this eval care.    X___________________________________________________ Date____________________    Certification From: 6/91/0228  To:4/19/2022

## 2022-01-25 ENCOUNTER — OFFICE VISIT (OUTPATIENT)
Dept: PHYSICAL THERAPY | Age: 42
End: 2022-01-25
Attending: FAMILY MEDICINE
Payer: COMMERCIAL

## 2022-01-25 PROCEDURE — 97110 THERAPEUTIC EXERCISES: CPT

## 2022-01-25 PROCEDURE — 97032 APPL MODALITY 1+ESTIM EA 15: CPT

## 2022-01-25 PROCEDURE — 97140 MANUAL THERAPY 1/> REGIONS: CPT

## 2022-01-25 NOTE — PROGRESS NOTES
Dx: Strain of hamstring muscle, left, initial encounter (F83.802Y)      Rehab dx: lower lumbar facet joint arthropathy         Insurance (Authorized # of Visits):  BCBS PPO 10 recommended           Authorizing Physician: Dr. Demetri Carroll  Next MD visit: none sc flexion, biat sh abd, bilat sh diag V, alt fwd reach, bilat HBH shoulder ER/ADD stretch x10 ea  DNF on FR 10x10 sec       tyrese pose central, L lumbar rotation 3x15 sec ea       Seated lumbar flexion 3x15 sec       RTB lateral band walking 35ft x 2

## 2022-01-27 ENCOUNTER — APPOINTMENT (OUTPATIENT)
Dept: PHYSICAL THERAPY | Age: 42
End: 2022-01-27
Attending: FAMILY MEDICINE
Payer: COMMERCIAL

## 2022-01-27 ENCOUNTER — OFFICE VISIT (OUTPATIENT)
Dept: PHYSICAL THERAPY | Age: 42
End: 2022-01-27
Attending: FAMILY MEDICINE
Payer: COMMERCIAL

## 2022-01-27 PROCEDURE — 97110 THERAPEUTIC EXERCISES: CPT

## 2022-01-27 PROCEDURE — 97140 MANUAL THERAPY 1/> REGIONS: CPT

## 2022-01-28 NOTE — PROGRESS NOTES
Dx: Strain of hamstring muscle, left, initial encounter (Q29.848N)      Rehab dx: lower lumbar facet joint arthropathy         Insurance (Authorized # of Visits):  BCBS PPO 10 recommended           Authorizing Physician: Dr. Rodgers ref.  provider found  Next MD [address] shoulder ER/ADD stretch x10 ea  DNF on FR 10x10 sec       tyrese pose central, L lumbar rotation 3x15 sec ea tyrese pose central, L lumbar rotation 3x15 sec ea      Seated lumbar flexion 3x15 sec Seated lumbar flexion 3x15 sec      RTB lateral band wal

## 2022-01-31 ENCOUNTER — OFFICE VISIT (OUTPATIENT)
Dept: PHYSICAL THERAPY | Age: 42
End: 2022-01-31
Attending: FAMILY MEDICINE
Payer: COMMERCIAL

## 2022-01-31 PROCEDURE — 97140 MANUAL THERAPY 1/> REGIONS: CPT

## 2022-01-31 PROCEDURE — 97014 ELECTRIC STIMULATION THERAPY: CPT

## 2022-01-31 PROCEDURE — 97110 THERAPEUTIC EXERCISES: CPT

## 2022-01-31 NOTE — PROGRESS NOTES
Dx: Strain of hamstring muscle, left, initial encounter (X21.044G)      Rehab dx: lower lumbar facet joint arthropathy         Insurance (Authorized # of Visits):  BCBS PPO 10 recommended           Authorizing Physician: Dr. Rosales Lopez  Next MD visit: none sc alt fwd reach, bilat HBH shoulder ER/ADD stretch x10 ea  DNF on FR 10x10 sec     tyrese pose central, L lumbar rotation 3x15 sec ea tyrese pose central, L lumbar rotation 3x15 sec ea tyrese pose central, L lumbar rotation 3x15 sec ea     Seated lumbar flex

## 2022-02-03 ENCOUNTER — OFFICE VISIT (OUTPATIENT)
Dept: PHYSICAL THERAPY | Age: 42
End: 2022-02-03
Attending: FAMILY MEDICINE
Payer: COMMERCIAL

## 2022-02-03 PROCEDURE — 97110 THERAPEUTIC EXERCISES: CPT

## 2022-02-03 PROCEDURE — 97140 MANUAL THERAPY 1/> REGIONS: CPT

## 2022-02-03 PROCEDURE — 97014 ELECTRIC STIMULATION THERAPY: CPT

## 2022-02-07 ENCOUNTER — OFFICE VISIT (OUTPATIENT)
Dept: PHYSICAL THERAPY | Age: 42
End: 2022-02-07
Attending: FAMILY MEDICINE
Payer: COMMERCIAL

## 2022-02-07 PROCEDURE — 97014 ELECTRIC STIMULATION THERAPY: CPT

## 2022-02-07 PROCEDURE — 97110 THERAPEUTIC EXERCISES: CPT

## 2022-02-07 PROCEDURE — 97140 MANUAL THERAPY 1/> REGIONS: CPT

## 2022-02-10 ENCOUNTER — OFFICE VISIT (OUTPATIENT)
Dept: PHYSICAL THERAPY | Age: 42
End: 2022-02-10
Attending: FAMILY MEDICINE
Payer: COMMERCIAL

## 2022-02-10 PROCEDURE — 97110 THERAPEUTIC EXERCISES: CPT

## 2022-02-10 PROCEDURE — 97140 MANUAL THERAPY 1/> REGIONS: CPT

## 2022-02-15 ENCOUNTER — OFFICE VISIT (OUTPATIENT)
Dept: PHYSICAL THERAPY | Age: 42
End: 2022-02-15
Attending: FAMILY MEDICINE
Payer: COMMERCIAL

## 2022-02-15 PROCEDURE — 97110 THERAPEUTIC EXERCISES: CPT

## 2022-02-15 PROCEDURE — 97014 ELECTRIC STIMULATION THERAPY: CPT

## 2022-02-17 ENCOUNTER — OFFICE VISIT (OUTPATIENT)
Dept: PHYSICAL THERAPY | Age: 42
End: 2022-02-17
Attending: FAMILY MEDICINE
Payer: COMMERCIAL

## 2022-02-17 PROCEDURE — 97014 ELECTRIC STIMULATION THERAPY: CPT

## 2022-02-17 PROCEDURE — 97110 THERAPEUTIC EXERCISES: CPT

## 2022-02-22 ENCOUNTER — OFFICE VISIT (OUTPATIENT)
Dept: PHYSICAL THERAPY | Age: 42
End: 2022-02-22
Attending: FAMILY MEDICINE
Payer: COMMERCIAL

## 2022-02-22 PROCEDURE — 97110 THERAPEUTIC EXERCISES: CPT

## 2022-02-22 PROCEDURE — 97014 ELECTRIC STIMULATION THERAPY: CPT

## 2022-02-24 ENCOUNTER — OFFICE VISIT (OUTPATIENT)
Dept: PHYSICAL THERAPY | Age: 42
End: 2022-02-24
Attending: FAMILY MEDICINE
Payer: COMMERCIAL

## 2022-02-24 PROCEDURE — 97014 ELECTRIC STIMULATION THERAPY: CPT

## 2022-02-24 PROCEDURE — 97110 THERAPEUTIC EXERCISES: CPT

## 2022-02-28 ENCOUNTER — OFFICE VISIT (OUTPATIENT)
Dept: PHYSICAL THERAPY | Age: 42
End: 2022-02-28
Attending: FAMILY MEDICINE
Payer: COMMERCIAL

## 2022-02-28 PROCEDURE — 97110 THERAPEUTIC EXERCISES: CPT

## 2022-02-28 PROCEDURE — 97140 MANUAL THERAPY 1/> REGIONS: CPT

## 2022-03-03 ENCOUNTER — OFFICE VISIT (OUTPATIENT)
Dept: PHYSICAL THERAPY | Age: 42
End: 2022-03-03
Attending: FAMILY MEDICINE
Payer: COMMERCIAL

## 2022-03-03 PROCEDURE — 97140 MANUAL THERAPY 1/> REGIONS: CPT

## 2022-03-03 PROCEDURE — 97032 APPL MODALITY 1+ESTIM EA 15: CPT

## 2022-03-03 PROCEDURE — 97110 THERAPEUTIC EXERCISES: CPT

## 2022-03-08 ENCOUNTER — OFFICE VISIT (OUTPATIENT)
Dept: PHYSICAL THERAPY | Age: 42
End: 2022-03-08
Attending: FAMILY MEDICINE
Payer: COMMERCIAL

## 2022-03-08 PROCEDURE — 97014 ELECTRIC STIMULATION THERAPY: CPT

## 2022-03-08 PROCEDURE — 97110 THERAPEUTIC EXERCISES: CPT

## 2022-03-08 PROCEDURE — 97112 NEUROMUSCULAR REEDUCATION: CPT

## 2022-03-10 ENCOUNTER — OFFICE VISIT (OUTPATIENT)
Dept: PHYSICAL THERAPY | Age: 42
End: 2022-03-10
Attending: FAMILY MEDICINE
Payer: COMMERCIAL

## 2022-03-10 PROCEDURE — 97110 THERAPEUTIC EXERCISES: CPT

## 2022-03-10 PROCEDURE — 97014 ELECTRIC STIMULATION THERAPY: CPT

## 2022-03-10 PROCEDURE — 97112 NEUROMUSCULAR REEDUCATION: CPT

## 2022-03-15 ENCOUNTER — OFFICE VISIT (OUTPATIENT)
Dept: PHYSICAL THERAPY | Age: 42
End: 2022-03-15
Attending: FAMILY MEDICINE
Payer: COMMERCIAL

## 2022-03-15 PROCEDURE — 97110 THERAPEUTIC EXERCISES: CPT

## 2022-03-15 PROCEDURE — 97112 NEUROMUSCULAR REEDUCATION: CPT

## 2022-03-17 ENCOUNTER — OFFICE VISIT (OUTPATIENT)
Dept: PHYSICAL THERAPY | Age: 42
End: 2022-03-17
Attending: FAMILY MEDICINE
Payer: COMMERCIAL

## 2022-03-17 PROCEDURE — 97112 NEUROMUSCULAR REEDUCATION: CPT

## 2022-03-17 PROCEDURE — 97014 ELECTRIC STIMULATION THERAPY: CPT

## 2022-03-17 PROCEDURE — 97110 THERAPEUTIC EXERCISES: CPT

## 2022-03-21 ENCOUNTER — OFFICE VISIT (OUTPATIENT)
Dept: PHYSICAL THERAPY | Age: 42
End: 2022-03-21
Attending: FAMILY MEDICINE
Payer: COMMERCIAL

## 2022-03-21 PROCEDURE — 97110 THERAPEUTIC EXERCISES: CPT

## 2022-03-21 PROCEDURE — 97014 ELECTRIC STIMULATION THERAPY: CPT

## 2022-03-21 PROCEDURE — 97112 NEUROMUSCULAR REEDUCATION: CPT

## 2022-04-07 ENCOUNTER — OFFICE VISIT (OUTPATIENT)
Dept: PHYSICAL THERAPY | Age: 42
End: 2022-04-07
Attending: FAMILY MEDICINE
Payer: COMMERCIAL

## 2022-04-07 PROCEDURE — 97110 THERAPEUTIC EXERCISES: CPT

## 2022-04-07 PROCEDURE — 97014 ELECTRIC STIMULATION THERAPY: CPT

## 2022-04-07 PROCEDURE — 97112 NEUROMUSCULAR REEDUCATION: CPT

## 2022-04-12 ENCOUNTER — OFFICE VISIT (OUTPATIENT)
Dept: PHYSICAL THERAPY | Age: 42
End: 2022-04-12
Attending: FAMILY MEDICINE
Payer: COMMERCIAL

## 2022-04-12 PROCEDURE — 97140 MANUAL THERAPY 1/> REGIONS: CPT

## 2022-04-12 PROCEDURE — 97112 NEUROMUSCULAR REEDUCATION: CPT

## 2022-04-12 PROCEDURE — 97014 ELECTRIC STIMULATION THERAPY: CPT

## 2022-04-12 PROCEDURE — 97110 THERAPEUTIC EXERCISES: CPT

## 2022-04-19 ENCOUNTER — OFFICE VISIT (OUTPATIENT)
Dept: PHYSICAL THERAPY | Age: 42
End: 2022-04-19
Attending: FAMILY MEDICINE
Payer: COMMERCIAL

## 2022-04-19 PROCEDURE — 97014 ELECTRIC STIMULATION THERAPY: CPT

## 2022-04-19 PROCEDURE — 97140 MANUAL THERAPY 1/> REGIONS: CPT

## 2022-04-19 PROCEDURE — 97110 THERAPEUTIC EXERCISES: CPT

## 2022-04-19 PROCEDURE — 97112 NEUROMUSCULAR REEDUCATION: CPT

## 2022-04-26 ENCOUNTER — OFFICE VISIT (OUTPATIENT)
Dept: PHYSICAL THERAPY | Age: 42
End: 2022-04-26
Attending: FAMILY MEDICINE
Payer: COMMERCIAL

## 2022-04-26 PROCEDURE — 97014 ELECTRIC STIMULATION THERAPY: CPT

## 2022-04-26 PROCEDURE — 97112 NEUROMUSCULAR REEDUCATION: CPT

## 2022-04-26 PROCEDURE — 97110 THERAPEUTIC EXERCISES: CPT

## 2022-05-02 ENCOUNTER — OFFICE VISIT (OUTPATIENT)
Dept: FAMILY MEDICINE CLINIC | Facility: CLINIC | Age: 42
End: 2022-05-02
Payer: COMMERCIAL

## 2022-05-02 VITALS
HEIGHT: 63 IN | SYSTOLIC BLOOD PRESSURE: 98 MMHG | DIASTOLIC BLOOD PRESSURE: 70 MMHG | RESPIRATION RATE: 16 BRPM | WEIGHT: 165 LBS | HEART RATE: 71 BPM | BODY MASS INDEX: 29.23 KG/M2 | OXYGEN SATURATION: 99 %

## 2022-05-02 DIAGNOSIS — R09.82 POST-NASAL DRIP: Primary | ICD-10-CM

## 2022-05-02 DIAGNOSIS — R05.3 PERSISTENT COUGH: ICD-10-CM

## 2022-05-02 PROCEDURE — 3008F BODY MASS INDEX DOCD: CPT | Performed by: FAMILY MEDICINE

## 2022-05-02 PROCEDURE — 3074F SYST BP LT 130 MM HG: CPT | Performed by: FAMILY MEDICINE

## 2022-05-02 PROCEDURE — 3078F DIAST BP <80 MM HG: CPT | Performed by: FAMILY MEDICINE

## 2022-05-02 PROCEDURE — 99213 OFFICE O/P EST LOW 20 MIN: CPT | Performed by: FAMILY MEDICINE

## 2022-05-02 RX ORDER — PREDNISONE 20 MG/1
40 TABLET ORAL DAILY
Qty: 10 TABLET | Refills: 0 | Status: SHIPPED | OUTPATIENT
Start: 2022-05-02 | End: 2022-05-07

## 2022-05-02 RX ORDER — CODEINE PHOSPHATE AND GUAIFENESIN 10; 100 MG/5ML; MG/5ML
5 SOLUTION ORAL EVERY 6 HOURS PRN
Qty: 180 ML | Refills: 0 | Status: SHIPPED | OUTPATIENT
Start: 2022-05-02

## 2022-05-16 ENCOUNTER — TELEPHONE (OUTPATIENT)
Dept: FAMILY MEDICINE CLINIC | Facility: CLINIC | Age: 42
End: 2022-05-16

## 2022-05-16 NOTE — TELEPHONE ENCOUNTER
If she has tried antihistamines/nasal steroids without improvement, we can try treating as possible sinus infection with course of augment 875 BID x 10 days. If she hasn't tried those, would recommend.     If still ongoing may need further work up

## 2022-05-16 NOTE — TELEPHONE ENCOUNTER
Spoke to pt she is not taking antihistamines or nasal steroids. She will try if no relief will call back for antibiotic.

## 2022-05-16 NOTE — TELEPHONE ENCOUNTER
Please see message from pt, any other suggestions? Pt states when taking steriod no cough. 2 days after stopping cough back and getting worse. Follow up appt?

## 2022-05-25 ENCOUNTER — TELEPHONE (OUTPATIENT)
Dept: FAMILY MEDICINE CLINIC | Facility: CLINIC | Age: 42
End: 2022-05-25

## 2022-05-25 NOTE — TELEPHONE ENCOUNTER
Spoke with patient , patient finished prednisone, not taking cough medicine at this time. Taking: claritin daily and flonase twice daily. Cough was improved, but not resolved and now worse again, + nasal congestion, not as bad as when she was at [address] on 5/2/22. Still having coughing fits. No fever, non productive. No SOB. Dr. Steven Healy is out of office till 5/27, Dr. Sania Ulloa, please review message, are you willing to interject on cough plan of care?

## 2022-05-26 DIAGNOSIS — F51.01 PRIMARY INSOMNIA: ICD-10-CM

## 2022-05-26 RX ORDER — TRAZODONE HYDROCHLORIDE 50 MG/1
TABLET ORAL
Qty: 90 TABLET | Refills: 1 | Status: SHIPPED | OUTPATIENT
Start: 2022-05-26

## 2022-05-27 ENCOUNTER — OFFICE VISIT (OUTPATIENT)
Dept: FAMILY MEDICINE CLINIC | Facility: CLINIC | Age: 42
End: 2022-05-27
Payer: COMMERCIAL

## 2022-05-27 VITALS
WEIGHT: 165.19 LBS | BODY MASS INDEX: 29.27 KG/M2 | RESPIRATION RATE: 16 BRPM | OXYGEN SATURATION: 98 % | HEART RATE: 88 BPM | SYSTOLIC BLOOD PRESSURE: 142 MMHG | HEIGHT: 63 IN | DIASTOLIC BLOOD PRESSURE: 80 MMHG

## 2022-05-27 DIAGNOSIS — J32.0 CHRONIC MAXILLARY SINUSITIS: Primary | ICD-10-CM

## 2022-05-27 DIAGNOSIS — R05.8 POST-VIRAL COUGH SYNDROME: ICD-10-CM

## 2022-05-27 PROCEDURE — 3077F SYST BP >= 140 MM HG: CPT | Performed by: FAMILY MEDICINE

## 2022-05-27 PROCEDURE — 3008F BODY MASS INDEX DOCD: CPT | Performed by: FAMILY MEDICINE

## 2022-05-27 PROCEDURE — 99213 OFFICE O/P EST LOW 20 MIN: CPT | Performed by: FAMILY MEDICINE

## 2022-05-27 PROCEDURE — 3079F DIAST BP 80-89 MM HG: CPT | Performed by: FAMILY MEDICINE

## 2022-05-27 RX ORDER — LIDOCAINE HYDROCHLORIDE 20 MG/ML
5 SOLUTION OROPHARYNGEAL AS NEEDED
Qty: 100 ML | Refills: 0 | Status: SHIPPED | OUTPATIENT
Start: 2022-05-27 | End: 2022-06-06

## 2022-05-27 RX ORDER — AMOXICILLIN AND CLAVULANATE POTASSIUM 875; 125 MG/1; MG/1
1 TABLET, FILM COATED ORAL 2 TIMES DAILY
Qty: 20 TABLET | Refills: 0 | Status: SHIPPED | OUTPATIENT
Start: 2022-05-27 | End: 2022-06-06

## 2022-08-29 DIAGNOSIS — F51.01 PRIMARY INSOMNIA: ICD-10-CM

## 2022-08-29 RX ORDER — TRAZODONE HYDROCHLORIDE 50 MG/1
TABLET ORAL
Qty: 90 TABLET | Refills: 1 | Status: SHIPPED | OUTPATIENT
Start: 2022-08-29

## 2023-01-10 ENCOUNTER — LAB ENCOUNTER (OUTPATIENT)
Dept: LAB | Age: 43
End: 2023-01-10
Attending: FAMILY MEDICINE
Payer: COMMERCIAL

## 2023-01-10 ENCOUNTER — OFFICE VISIT (OUTPATIENT)
Dept: FAMILY MEDICINE CLINIC | Facility: CLINIC | Age: 43
End: 2023-01-10
Payer: COMMERCIAL

## 2023-01-10 VITALS
OXYGEN SATURATION: 100 % | HEIGHT: 63 IN | RESPIRATION RATE: 18 BRPM | WEIGHT: 163.38 LBS | SYSTOLIC BLOOD PRESSURE: 148 MMHG | HEART RATE: 92 BPM | BODY MASS INDEX: 28.95 KG/M2 | DIASTOLIC BLOOD PRESSURE: 90 MMHG

## 2023-01-10 DIAGNOSIS — N92.0 MENORRHAGIA WITH REGULAR CYCLE: ICD-10-CM

## 2023-01-10 DIAGNOSIS — L65.9 ALOPECIA: ICD-10-CM

## 2023-01-10 DIAGNOSIS — Z12.31 SCREENING MAMMOGRAM FOR BREAST CANCER: ICD-10-CM

## 2023-01-10 DIAGNOSIS — R01.1 HEART MURMUR, SYSTOLIC: ICD-10-CM

## 2023-01-10 DIAGNOSIS — Z01.419 WELL WOMAN EXAM WITH ROUTINE GYNECOLOGICAL EXAM: ICD-10-CM

## 2023-01-10 DIAGNOSIS — L73.2 HYDRADENITIS: ICD-10-CM

## 2023-01-10 DIAGNOSIS — Z01.419 WELL WOMAN EXAM WITH ROUTINE GYNECOLOGICAL EXAM: Primary | ICD-10-CM

## 2023-01-10 LAB
ALBUMIN SERPL-MCNC: 4.1 G/DL (ref 3.4–5)
ALBUMIN/GLOB SERPL: 0.9 {RATIO} (ref 1–2)
ALP LIVER SERPL-CCNC: 76 U/L
ALT SERPL-CCNC: 31 U/L
ANION GAP SERPL CALC-SCNC: 3 MMOL/L (ref 0–18)
AST SERPL-CCNC: 15 U/L (ref 15–37)
BILIRUB SERPL-MCNC: 0.4 MG/DL (ref 0.1–2)
BUN BLD-MCNC: 8 MG/DL (ref 7–18)
CALCIUM BLD-MCNC: 9.6 MG/DL (ref 8.5–10.1)
CHLORIDE SERPL-SCNC: 106 MMOL/L (ref 98–112)
CHOLEST SERPL-MCNC: 245 MG/DL (ref ?–200)
CO2 SERPL-SCNC: 27 MMOL/L (ref 21–32)
CREAT BLD-MCNC: 0.72 MG/DL
CRP SERPL-MCNC: 0.55 MG/DL (ref ?–0.3)
EST. AVERAGE GLUCOSE BLD GHB EST-MCNC: 123 MG/DL (ref 68–126)
FASTING PATIENT LIPID ANSWER: NO
FASTING STATUS PATIENT QL REPORTED: NO
GFR SERPLBLD BASED ON 1.73 SQ M-ARVRAT: 106 ML/MIN/1.73M2 (ref 60–?)
GLOBULIN PLAS-MCNC: 4.4 G/DL (ref 2.8–4.4)
GLUCOSE BLD-MCNC: 97 MG/DL (ref 70–99)
HBA1C MFR BLD: 5.9 % (ref ?–5.7)
HDLC SERPL-MCNC: 49 MG/DL (ref 40–59)
LDLC SERPL CALC-MCNC: 165 MG/DL (ref ?–100)
NONHDLC SERPL-MCNC: 196 MG/DL (ref ?–130)
OSMOLALITY SERPL CALC.SUM OF ELEC: 280 MOSM/KG (ref 275–295)
POTASSIUM SERPL-SCNC: 3.7 MMOL/L (ref 3.5–5.1)
PROT SERPL-MCNC: 8.5 G/DL (ref 6.4–8.2)
SODIUM SERPL-SCNC: 136 MMOL/L (ref 136–145)
T4 FREE SERPL-MCNC: 1 NG/DL (ref 0.8–1.7)
TRIGL SERPL-MCNC: 170 MG/DL (ref 30–149)
TSI SER-ACNC: 2.23 MIU/ML (ref 0.36–3.74)
VLDLC SERPL CALC-MCNC: 34 MG/DL (ref 0–30)

## 2023-01-10 PROCEDURE — 83036 HEMOGLOBIN GLYCOSYLATED A1C: CPT | Performed by: FAMILY MEDICINE

## 2023-01-10 PROCEDURE — 3080F DIAST BP >= 90 MM HG: CPT | Performed by: FAMILY MEDICINE

## 2023-01-10 PROCEDURE — 84439 ASSAY OF FREE THYROXINE: CPT | Performed by: FAMILY MEDICINE

## 2023-01-10 PROCEDURE — 3077F SYST BP >= 140 MM HG: CPT | Performed by: FAMILY MEDICINE

## 2023-01-10 PROCEDURE — 3008F BODY MASS INDEX DOCD: CPT | Performed by: FAMILY MEDICINE

## 2023-01-10 PROCEDURE — 84443 ASSAY THYROID STIM HORMONE: CPT | Performed by: FAMILY MEDICINE

## 2023-01-10 PROCEDURE — 87624 HPV HI-RISK TYP POOLED RSLT: CPT | Performed by: FAMILY MEDICINE

## 2023-01-10 PROCEDURE — 80053 COMPREHEN METABOLIC PANEL: CPT | Performed by: FAMILY MEDICINE

## 2023-01-10 PROCEDURE — 99213 OFFICE O/P EST LOW 20 MIN: CPT | Performed by: FAMILY MEDICINE

## 2023-01-10 PROCEDURE — 86140 C-REACTIVE PROTEIN: CPT | Performed by: FAMILY MEDICINE

## 2023-01-10 PROCEDURE — 99396 PREV VISIT EST AGE 40-64: CPT | Performed by: FAMILY MEDICINE

## 2023-01-10 PROCEDURE — 80061 LIPID PANEL: CPT | Performed by: FAMILY MEDICINE

## 2023-01-11 PROBLEM — R73.01 IMPAIRED FASTING GLUCOSE: Status: ACTIVE | Noted: 2023-01-11

## 2023-01-11 PROBLEM — E78.2 MIXED HYPERLIPIDEMIA: Status: ACTIVE | Noted: 2023-01-11

## 2023-01-11 LAB — HPV I/H RISK 1 DNA SPEC QL NAA+PROBE: NEGATIVE

## 2023-01-18 ENCOUNTER — HOSPITAL ENCOUNTER (OUTPATIENT)
Dept: MAMMOGRAPHY | Age: 43
Discharge: HOME | End: 2023-01-18
Attending: FAMILY MEDICINE
Payer: COMMERCIAL

## 2023-01-18 DIAGNOSIS — Z12.31 SCREENING MAMMOGRAM FOR BREAST CANCER: ICD-10-CM

## 2023-01-18 PROCEDURE — 77063 BREAST TOMOSYNTHESIS BI: CPT | Performed by: FAMILY MEDICINE

## 2023-01-18 PROCEDURE — 77067 SCR MAMMO BI INCL CAD: CPT | Performed by: FAMILY MEDICINE

## 2023-01-19 ENCOUNTER — HOSPITAL ENCOUNTER (OUTPATIENT)
Dept: CV DIAGNOSTICS | Age: 43
Discharge: HOME | End: 2023-01-19
Attending: FAMILY MEDICINE
Payer: COMMERCIAL

## 2023-01-19 DIAGNOSIS — R01.1 HEART MURMUR, SYSTOLIC: ICD-10-CM

## 2023-01-19 PROCEDURE — 93306 TTE W/DOPPLER COMPLETE: CPT | Performed by: FAMILY MEDICINE

## 2023-01-30 ENCOUNTER — OFFICE VISIT (OUTPATIENT)
Facility: LOCATION | Age: 43
End: 2023-01-30
Payer: COMMERCIAL

## 2023-01-30 VITALS — HEART RATE: 59 BPM | TEMPERATURE: 99 F

## 2023-01-30 DIAGNOSIS — L73.2 HIDRADENITIS AXILLARIS: Primary | ICD-10-CM

## 2023-04-24 ENCOUNTER — OFFICE VISIT (OUTPATIENT)
Dept: FAMILY MEDICINE CLINIC | Facility: CLINIC | Age: 43
End: 2023-04-24
Payer: COMMERCIAL

## 2023-04-24 VITALS
DIASTOLIC BLOOD PRESSURE: 78 MMHG | HEART RATE: 66 BPM | SYSTOLIC BLOOD PRESSURE: 108 MMHG | HEIGHT: 63 IN | WEIGHT: 163 LBS | OXYGEN SATURATION: 100 % | BODY MASS INDEX: 28.88 KG/M2 | RESPIRATION RATE: 16 BRPM | TEMPERATURE: 99 F

## 2023-04-24 DIAGNOSIS — J01.90 ACUTE SINUSITIS, RECURRENCE NOT SPECIFIED, UNSPECIFIED LOCATION: Primary | ICD-10-CM

## 2023-04-24 DIAGNOSIS — R05.3 PERSISTENT COUGH: ICD-10-CM

## 2023-04-24 DIAGNOSIS — R09.82 POST-NASAL DRIP: ICD-10-CM

## 2023-04-24 PROCEDURE — 3078F DIAST BP <80 MM HG: CPT | Performed by: FAMILY MEDICINE

## 2023-04-24 PROCEDURE — 3008F BODY MASS INDEX DOCD: CPT | Performed by: FAMILY MEDICINE

## 2023-04-24 PROCEDURE — 99213 OFFICE O/P EST LOW 20 MIN: CPT | Performed by: FAMILY MEDICINE

## 2023-04-24 PROCEDURE — 3074F SYST BP LT 130 MM HG: CPT | Performed by: FAMILY MEDICINE

## 2023-04-24 RX ORDER — CODEINE PHOSPHATE AND GUAIFENESIN 10; 100 MG/5ML; MG/5ML
5 SOLUTION ORAL EVERY 6 HOURS PRN
Qty: 180 ML | Refills: 0 | Status: SHIPPED | OUTPATIENT
Start: 2023-04-24

## 2023-04-24 RX ORDER — PREDNISONE 20 MG/1
40 TABLET ORAL DAILY
Qty: 10 TABLET | Refills: 0 | Status: SHIPPED | OUTPATIENT
Start: 2023-04-24 | End: 2023-04-29

## 2023-04-24 RX ORDER — AMOXICILLIN AND CLAVULANATE POTASSIUM 875; 125 MG/1; MG/1
1 TABLET, FILM COATED ORAL 2 TIMES DAILY
Qty: 20 TABLET | Refills: 0 | Status: SHIPPED | OUTPATIENT
Start: 2023-04-24 | End: 2023-05-04

## 2023-05-25 DIAGNOSIS — F51.01 PRIMARY INSOMNIA: ICD-10-CM

## 2023-05-25 RX ORDER — TRAZODONE HYDROCHLORIDE 50 MG/1
TABLET ORAL
Qty: 90 TABLET | Refills: 1 | Status: SHIPPED | OUTPATIENT
Start: 2023-05-25

## 2023-08-03 ENCOUNTER — HOSPITAL ENCOUNTER (INPATIENT)
Facility: HOSPITAL | Age: 43
LOS: 1 days | Discharge: HOME | End: 2023-08-05
Attending: EMERGENCY MEDICINE | Admitting: HOSPITALIST
Payer: COMMERCIAL

## 2023-08-03 ENCOUNTER — OFFICE VISIT (OUTPATIENT)
Dept: FAMILY MEDICINE CLINIC | Facility: CLINIC | Age: 43
End: 2023-08-03
Payer: COMMERCIAL

## 2023-08-03 ENCOUNTER — APPOINTMENT (OUTPATIENT)
Dept: GENERAL RADIOLOGY | Facility: HOSPITAL | Age: 43
End: 2023-08-03
Payer: COMMERCIAL

## 2023-08-03 VITALS
RESPIRATION RATE: 16 BRPM | WEIGHT: 164.63 LBS | OXYGEN SATURATION: 98 % | BODY MASS INDEX: 29.17 KG/M2 | HEART RATE: 110 BPM | HEIGHT: 63 IN | DIASTOLIC BLOOD PRESSURE: 90 MMHG | SYSTOLIC BLOOD PRESSURE: 154 MMHG

## 2023-08-03 DIAGNOSIS — R00.0 TACHYCARDIA: ICD-10-CM

## 2023-08-03 DIAGNOSIS — E78.2 MIXED HYPERLIPIDEMIA: ICD-10-CM

## 2023-08-03 DIAGNOSIS — R03.0 ELEVATED BLOOD PRESSURE READING: ICD-10-CM

## 2023-08-03 DIAGNOSIS — M25.512 ACUTE PAIN OF LEFT SHOULDER: ICD-10-CM

## 2023-08-03 DIAGNOSIS — R06.09 DOE (DYSPNEA ON EXERTION): ICD-10-CM

## 2023-08-03 DIAGNOSIS — R77.8 ELEVATED TROPONIN: ICD-10-CM

## 2023-08-03 DIAGNOSIS — R07.89 INTERMITTENT LEFT-SIDED CHEST PAIN: Primary | ICD-10-CM

## 2023-08-03 DIAGNOSIS — R01.1 HEART MURMUR, SYSTOLIC: ICD-10-CM

## 2023-08-03 DIAGNOSIS — R73.01 IMPAIRED FASTING GLUCOSE: ICD-10-CM

## 2023-08-03 DIAGNOSIS — R07.9 ACUTE CHEST PAIN: Primary | ICD-10-CM

## 2023-08-03 DIAGNOSIS — R06.02 SHORTNESS OF BREATH: ICD-10-CM

## 2023-08-03 LAB
ALBUMIN SERPL-MCNC: 3.7 G/DL (ref 3.4–5)
ALBUMIN/GLOB SERPL: 1 {RATIO} (ref 1–2)
ALP LIVER SERPL-CCNC: 64 U/L
ALT SERPL-CCNC: 31 U/L
ANION GAP SERPL CALC-SCNC: 4 MMOL/L (ref 0–18)
AST SERPL-CCNC: 17 U/L (ref 15–37)
ATRIAL RATE: 100 BPM
B-HCG UR QL: NEGATIVE
BASOPHILS # BLD AUTO: 0.03 X10(3) UL (ref 0–0.2)
BASOPHILS NFR BLD AUTO: 0.3 %
BILIRUB SERPL-MCNC: 0.3 MG/DL (ref 0.1–2)
BUN BLD-MCNC: 13 MG/DL (ref 7–18)
CALCIUM BLD-MCNC: 8.9 MG/DL (ref 8.5–10.1)
CHLORIDE SERPL-SCNC: 107 MMOL/L (ref 98–112)
CHOLEST SERPL-MCNC: 204 MG/DL (ref ?–200)
CO2 SERPL-SCNC: 26 MMOL/L (ref 21–32)
CREAT BLD-MCNC: 0.8 MG/DL
D DIMER PPP FEU-MCNC: 0.36 UG/ML FEU (ref ?–0.5)
EGFRCR SERPLBLD CKD-EPI 2021: 93 ML/MIN/1.73M2 (ref 60–?)
EOSINOPHIL # BLD AUTO: 0.26 X10(3) UL (ref 0–0.7)
EOSINOPHIL NFR BLD AUTO: 2.7 %
ERYTHROCYTE [DISTWIDTH] IN BLOOD BY AUTOMATED COUNT: 13.5 %
GLOBULIN PLAS-MCNC: 3.7 G/DL (ref 2.8–4.4)
GLUCOSE BLD-MCNC: 99 MG/DL (ref 70–99)
HCT VFR BLD AUTO: 36.7 %
HDLC SERPL-MCNC: 41 MG/DL (ref 40–59)
HGB BLD-MCNC: 12.5 G/DL
IMM GRANULOCYTES # BLD AUTO: 0.02 X10(3) UL (ref 0–1)
IMM GRANULOCYTES NFR BLD: 0.2 %
LDLC SERPL CALC-MCNC: 111 MG/DL (ref ?–100)
LYMPHOCYTES # BLD AUTO: 2.54 X10(3) UL (ref 1–4)
LYMPHOCYTES NFR BLD AUTO: 26.7 %
MAGNESIUM SERPL-MCNC: 2.1 MG/DL (ref 1.6–2.6)
MCH RBC QN AUTO: 28 PG (ref 26–34)
MCHC RBC AUTO-ENTMCNC: 34.1 G/DL (ref 31–37)
MCV RBC AUTO: 82.3 FL
MONOCYTES # BLD AUTO: 0.59 X10(3) UL (ref 0.1–1)
MONOCYTES NFR BLD AUTO: 6.2 %
NEUTROPHILS # BLD AUTO: 6.09 X10 (3) UL (ref 1.5–7.7)
NEUTROPHILS # BLD AUTO: 6.09 X10(3) UL (ref 1.5–7.7)
NEUTROPHILS NFR BLD AUTO: 63.9 %
NONHDLC SERPL-MCNC: 163 MG/DL (ref ?–130)
NT-PROBNP SERPL-MCNC: 107 PG/ML (ref ?–125)
OSMOLALITY SERPL CALC.SUM OF ELEC: 284 MOSM/KG (ref 275–295)
P AXIS: 49 DEGREES
P-R INTERVAL: 192 MS
PLATELET # BLD AUTO: 247 10(3)UL (ref 150–450)
POTASSIUM SERPL-SCNC: 3.9 MMOL/L (ref 3.5–5.1)
PROT SERPL-MCNC: 7.4 G/DL (ref 6.4–8.2)
Q-T INTERVAL: 348 MS
QRS DURATION: 82 MS
QTC CALCULATION (BEZET): 448 MS
R AXIS: -12 DEGREES
RBC # BLD AUTO: 4.46 X10(6)UL
SODIUM SERPL-SCNC: 137 MMOL/L (ref 136–145)
T AXIS: 96 DEGREES
TRIGL SERPL-MCNC: 304 MG/DL (ref 30–149)
TROPONIN I HIGH SENSITIVITY: 57 NG/L
VENTRICULAR RATE: 100 BPM
VLDLC SERPL CALC-MCNC: 53 MG/DL (ref 0–30)
WBC # BLD AUTO: 9.5 X10(3) UL (ref 4–11)

## 2023-08-03 PROCEDURE — 3008F BODY MASS INDEX DOCD: CPT | Performed by: FAMILY MEDICINE

## 2023-08-03 PROCEDURE — 99223 1ST HOSP IP/OBS HIGH 75: CPT | Performed by: HOSPITALIST

## 2023-08-03 PROCEDURE — 93000 ELECTROCARDIOGRAM COMPLETE: CPT | Performed by: FAMILY MEDICINE

## 2023-08-03 PROCEDURE — 3080F DIAST BP >= 90 MM HG: CPT | Performed by: FAMILY MEDICINE

## 2023-08-03 PROCEDURE — 99214 OFFICE O/P EST MOD 30 MIN: CPT | Performed by: FAMILY MEDICINE

## 2023-08-03 PROCEDURE — 3077F SYST BP >= 140 MM HG: CPT | Performed by: FAMILY MEDICINE

## 2023-08-03 PROCEDURE — 71045 X-RAY EXAM CHEST 1 VIEW: CPT | Performed by: EMERGENCY MEDICINE

## 2023-08-03 RX ORDER — LABETALOL HYDROCHLORIDE 5 MG/ML
10 INJECTION, SOLUTION INTRAVENOUS ONCE
Status: DISCONTINUED | OUTPATIENT
Start: 2023-08-03 | End: 2023-08-03

## 2023-08-03 NOTE — ED INITIAL ASSESSMENT (HPI)
Pt to ED via walk in c/o chest heaviness that radiated to left shoulrder and arm. Pt reports pain started about a week and a half ago, last night the pain woke her up. Pt reports when the chest pain begins she feels short of breath. Reports dyspnea on exertion. 98% RA currently. Went to PCP office today, had MAYRA and was given 4 of 81 mg aspirin. A&Ox4.

## 2023-08-04 ENCOUNTER — APPOINTMENT (OUTPATIENT)
Dept: CT IMAGING | Facility: HOSPITAL | Age: 43
End: 2023-08-04
Attending: STUDENT IN AN ORGANIZED HEALTH CARE EDUCATION/TRAINING PROGRAM
Payer: COMMERCIAL

## 2023-08-04 ENCOUNTER — APPOINTMENT (OUTPATIENT)
Dept: INTERVENTIONAL RADIOLOGY/VASCULAR | Facility: HOSPITAL | Age: 43
End: 2023-08-04
Attending: NURSE PRACTITIONER
Payer: COMMERCIAL

## 2023-08-04 ENCOUNTER — APPOINTMENT (OUTPATIENT)
Dept: CT IMAGING | Facility: HOSPITAL | Age: 43
End: 2023-08-04
Attending: NURSE PRACTITIONER
Payer: COMMERCIAL

## 2023-08-04 ENCOUNTER — APPOINTMENT (OUTPATIENT)
Dept: CV DIAGNOSTICS | Facility: HOSPITAL | Age: 43
End: 2023-08-04
Attending: NURSE PRACTITIONER
Payer: COMMERCIAL

## 2023-08-04 PROBLEM — R77.8 ELEVATED TROPONIN: Status: ACTIVE | Noted: 2023-08-04

## 2023-08-04 PROBLEM — I95.89 HYPOTENSION DUE TO HYPOVOLEMIA: Status: ACTIVE | Noted: 2023-08-04

## 2023-08-04 PROBLEM — R79.89 ELEVATED TROPONIN: Status: ACTIVE | Noted: 2023-08-04

## 2023-08-04 PROBLEM — E86.1 HYPOTENSION DUE TO HYPOVOLEMIA: Status: ACTIVE | Noted: 2023-08-04

## 2023-08-04 PROBLEM — R03.0 ELEVATED BLOOD PRESSURE READING: Status: ACTIVE | Noted: 2023-08-04

## 2023-08-04 PROBLEM — R06.09 DOE (DYSPNEA ON EXERTION): Status: ACTIVE | Noted: 2023-08-04

## 2023-08-04 LAB
ALBUMIN SERPL-MCNC: 3.4 G/DL (ref 3.4–5)
ALBUMIN/GLOB SERPL: 1 {RATIO} (ref 1–2)
ALP LIVER SERPL-CCNC: 63 U/L
ALT SERPL-CCNC: 27 U/L
ANION GAP SERPL CALC-SCNC: 7 MMOL/L (ref 0–18)
AST SERPL-CCNC: 13 U/L (ref 15–37)
ATRIAL RATE: 108 BPM
BASOPHILS # BLD AUTO: 0.02 X10(3) UL (ref 0–0.2)
BASOPHILS # BLD AUTO: 0.02 X10(3) UL (ref 0–0.2)
BASOPHILS NFR BLD AUTO: 0.3 %
BASOPHILS NFR BLD AUTO: 0.3 %
BILIRUB SERPL-MCNC: 0.4 MG/DL (ref 0.1–2)
BUN BLD-MCNC: 11 MG/DL (ref 7–18)
CALCIUM BLD-MCNC: 8.9 MG/DL (ref 8.5–10.1)
CHLORIDE SERPL-SCNC: 109 MMOL/L (ref 98–112)
CO2 SERPL-SCNC: 22 MMOL/L (ref 21–32)
CREAT BLD-MCNC: 0.68 MG/DL
EGFRCR SERPLBLD CKD-EPI 2021: 109 ML/MIN/1.73M2 (ref 60–?)
EOSINOPHIL # BLD AUTO: 0.17 X10(3) UL (ref 0–0.7)
EOSINOPHIL # BLD AUTO: 0.22 X10(3) UL (ref 0–0.7)
EOSINOPHIL NFR BLD AUTO: 2.4 %
EOSINOPHIL NFR BLD AUTO: 3.1 %
ERYTHROCYTE [DISTWIDTH] IN BLOOD BY AUTOMATED COUNT: 13.8 %
ERYTHROCYTE [DISTWIDTH] IN BLOOD BY AUTOMATED COUNT: 13.9 %
GLOBULIN PLAS-MCNC: 3.4 G/DL (ref 2.8–4.4)
GLUCOSE BLD-MCNC: 103 MG/DL (ref 70–99)
GLUCOSE BLD-MCNC: 109 MG/DL (ref 70–99)
HCT VFR BLD AUTO: 30.4 %
HCT VFR BLD AUTO: 33.5 %
HGB BLD-MCNC: 10.2 G/DL
HGB BLD-MCNC: 11 G/DL
IMM GRANULOCYTES # BLD AUTO: 0.02 X10(3) UL (ref 0–1)
IMM GRANULOCYTES # BLD AUTO: 0.02 X10(3) UL (ref 0–1)
IMM GRANULOCYTES NFR BLD: 0.3 %
IMM GRANULOCYTES NFR BLD: 0.3 %
ISTAT ACTIVATED CLOTTING TIME: 323 SECONDS (ref 74–137)
LACTATE SERPL-SCNC: 0.9 MMOL/L (ref 0.4–2)
LYMPHOCYTES # BLD AUTO: 2.42 X10(3) UL (ref 1–4)
LYMPHOCYTES # BLD AUTO: 2.66 X10(3) UL (ref 1–4)
LYMPHOCYTES NFR BLD AUTO: 33.7 %
LYMPHOCYTES NFR BLD AUTO: 37.4 %
MAGNESIUM SERPL-MCNC: 2.1 MG/DL (ref 1.6–2.6)
MCH RBC QN AUTO: 27.6 PG (ref 26–34)
MCH RBC QN AUTO: 28 PG (ref 26–34)
MCHC RBC AUTO-ENTMCNC: 32.8 G/DL (ref 31–37)
MCHC RBC AUTO-ENTMCNC: 33.6 G/DL (ref 31–37)
MCV RBC AUTO: 83.5 FL
MCV RBC AUTO: 84 FL
MONOCYTES # BLD AUTO: 0.37 X10(3) UL (ref 0.1–1)
MONOCYTES # BLD AUTO: 0.41 X10(3) UL (ref 0.1–1)
MONOCYTES NFR BLD AUTO: 5.2 %
MONOCYTES NFR BLD AUTO: 5.8 %
NEUTROPHILS # BLD AUTO: 3.78 X10 (3) UL (ref 1.5–7.7)
NEUTROPHILS # BLD AUTO: 3.78 X10(3) UL (ref 1.5–7.7)
NEUTROPHILS # BLD AUTO: 4.18 X10 (3) UL (ref 1.5–7.7)
NEUTROPHILS # BLD AUTO: 4.18 X10(3) UL (ref 1.5–7.7)
NEUTROPHILS NFR BLD AUTO: 53.1 %
NEUTROPHILS NFR BLD AUTO: 58.1 %
OSMOLALITY SERPL CALC.SUM OF ELEC: 286 MOSM/KG (ref 275–295)
P AXIS: 54 DEGREES
P-R INTERVAL: 190 MS
PLATELET # BLD AUTO: 195 10(3)UL (ref 150–450)
PLATELET # BLD AUTO: 220 10(3)UL (ref 150–450)
POTASSIUM SERPL-SCNC: 3.5 MMOL/L (ref 3.5–5.1)
PROT SERPL-MCNC: 6.8 G/DL (ref 6.4–8.2)
Q-T INTERVAL: 320 MS
QRS DURATION: 84 MS
QTC CALCULATION (BEZET): 428 MS
R AXIS: -18 DEGREES
RBC # BLD AUTO: 3.64 X10(6)UL
RBC # BLD AUTO: 3.99 X10(6)UL
SODIUM SERPL-SCNC: 138 MMOL/L (ref 136–145)
T AXIS: 34 DEGREES
TROPONIN I HIGH SENSITIVITY: 62 NG/L
TROPONIN I HIGH SENSITIVITY: 68 NG/L
VENTRICULAR RATE: 108 BPM
WBC # BLD AUTO: 7.1 X10(3) UL (ref 4–11)
WBC # BLD AUTO: 7.2 X10(3) UL (ref 4–11)

## 2023-08-04 PROCEDURE — B2111ZZ FLUOROSCOPY OF MULTIPLE CORONARY ARTERIES USING LOW OSMOLAR CONTRAST: ICD-10-PCS | Performed by: INTERNAL MEDICINE

## 2023-08-04 PROCEDURE — 027034Z DILATION OF CORONARY ARTERY, ONE ARTERY WITH DRUG-ELUTING INTRALUMINAL DEVICE, PERCUTANEOUS APPROACH: ICD-10-PCS | Performed by: INTERNAL MEDICINE

## 2023-08-04 PROCEDURE — B41G1ZZ FLUOROSCOPY OF LEFT LOWER EXTREMITY ARTERIES USING LOW OSMOLAR CONTRAST: ICD-10-PCS | Performed by: INTERNAL MEDICINE

## 2023-08-04 PROCEDURE — 93306 TTE W/DOPPLER COMPLETE: CPT | Performed by: NURSE PRACTITIONER

## 2023-08-04 PROCEDURE — 74176 CT ABD & PELVIS W/O CONTRAST: CPT | Performed by: STUDENT IN AN ORGANIZED HEALTH CARE EDUCATION/TRAINING PROGRAM

## 2023-08-04 PROCEDURE — 75574 CT ANGIO HRT W/3D IMAGE: CPT | Performed by: NURSE PRACTITIONER

## 2023-08-04 PROCEDURE — 4A023N7 MEASUREMENT OF CARDIAC SAMPLING AND PRESSURE, LEFT HEART, PERCUTANEOUS APPROACH: ICD-10-PCS | Performed by: INTERNAL MEDICINE

## 2023-08-04 PROCEDURE — B240ZZ3 ULTRASONOGRAPHY OF SINGLE CORONARY ARTERY, INTRAVASCULAR: ICD-10-PCS | Performed by: INTERNAL MEDICINE

## 2023-08-04 PROCEDURE — 99291 CRITICAL CARE FIRST HOUR: CPT | Performed by: STUDENT IN AN ORGANIZED HEALTH CARE EDUCATION/TRAINING PROGRAM

## 2023-08-04 PROCEDURE — 99232 SBSQ HOSP IP/OBS MODERATE 35: CPT | Performed by: HOSPITALIST

## 2023-08-04 RX ORDER — ASPIRIN 325 MG
325 TABLET ORAL DAILY
Status: DISCONTINUED | OUTPATIENT
Start: 2023-08-04 | End: 2023-08-05

## 2023-08-04 RX ORDER — PRASUGREL 10 MG/1
10 TABLET, FILM COATED ORAL DAILY
Status: DISCONTINUED | OUTPATIENT
Start: 2023-08-05 | End: 2023-08-05

## 2023-08-04 RX ORDER — DILTIAZEM HYDROCHLORIDE 5 MG/ML
5 INJECTION INTRAVENOUS SEE ADMIN INSTRUCTIONS
Status: DISCONTINUED | OUTPATIENT
Start: 2023-08-04 | End: 2023-08-04 | Stop reason: HOSPADM

## 2023-08-04 RX ORDER — METOPROLOL TARTRATE 100 MG/1
100 TABLET ORAL ONCE
Status: DISCONTINUED | OUTPATIENT
Start: 2023-08-04 | End: 2023-08-05

## 2023-08-04 RX ORDER — SODIUM CHLORIDE 9 MG/ML
INJECTION, SOLUTION INTRAVENOUS CONTINUOUS
Status: ACTIVE | OUTPATIENT
Start: 2023-08-04 | End: 2023-08-05

## 2023-08-04 RX ORDER — ENEMA 19; 7 G/133ML; G/133ML
1 ENEMA RECTAL ONCE AS NEEDED
Status: DISCONTINUED | OUTPATIENT
Start: 2023-08-04 | End: 2023-08-05

## 2023-08-04 RX ORDER — METOPROLOL TARTRATE 50 MG/1
50 TABLET, FILM COATED ORAL ONCE
Status: DISCONTINUED | OUTPATIENT
Start: 2023-08-04 | End: 2023-08-05

## 2023-08-04 RX ORDER — METOPROLOL TARTRATE 100 MG/1
100 TABLET ORAL ONCE AS NEEDED
Status: COMPLETED | OUTPATIENT
Start: 2023-08-04 | End: 2023-08-04

## 2023-08-04 RX ORDER — BENZONATATE 200 MG/1
200 CAPSULE ORAL 3 TIMES DAILY PRN
Status: DISCONTINUED | OUTPATIENT
Start: 2023-08-04 | End: 2023-08-05

## 2023-08-04 RX ORDER — ATORVASTATIN CALCIUM 40 MG/1
40 TABLET, FILM COATED ORAL NIGHTLY
Status: DISCONTINUED | OUTPATIENT
Start: 2023-08-04 | End: 2023-08-04

## 2023-08-04 RX ORDER — METOPROLOL TARTRATE 50 MG/1
50 TABLET, FILM COATED ORAL ONCE AS NEEDED
Status: DISCONTINUED | OUTPATIENT
Start: 2023-08-04 | End: 2023-08-05

## 2023-08-04 RX ORDER — PRASUGREL 10 MG/1
TABLET, FILM COATED ORAL
Status: COMPLETED
Start: 2023-08-04 | End: 2023-08-04

## 2023-08-04 RX ORDER — NITROGLYCERIN 0.4 MG/1
TABLET SUBLINGUAL
Status: COMPLETED
Start: 2023-08-04 | End: 2023-08-04

## 2023-08-04 RX ORDER — METOPROLOL TARTRATE 5 MG/5ML
INJECTION INTRAVENOUS
Status: DISCONTINUED
Start: 2023-08-04 | End: 2023-08-04 | Stop reason: WASHOUT

## 2023-08-04 RX ORDER — ONDANSETRON 2 MG/ML
4 INJECTION INTRAMUSCULAR; INTRAVENOUS EVERY 6 HOURS PRN
Status: DISCONTINUED | OUTPATIENT
Start: 2023-08-04 | End: 2023-08-05

## 2023-08-04 RX ORDER — ENOXAPARIN SODIUM 100 MG/ML
40 INJECTION SUBCUTANEOUS NIGHTLY
Status: DISCONTINUED | OUTPATIENT
Start: 2023-08-04 | End: 2023-08-05

## 2023-08-04 RX ORDER — VERAPAMIL HYDROCHLORIDE 2.5 MG/ML
INJECTION, SOLUTION INTRAVENOUS
Status: COMPLETED
Start: 2023-08-04 | End: 2023-08-04

## 2023-08-04 RX ORDER — METOPROLOL TARTRATE 50 MG/1
50 TABLET, FILM COATED ORAL ONCE AS NEEDED
Status: DISCONTINUED | OUTPATIENT
Start: 2023-08-05 | End: 2023-08-05

## 2023-08-04 RX ORDER — MELATONIN
3 NIGHTLY PRN
Status: DISCONTINUED | OUTPATIENT
Start: 2023-08-04 | End: 2023-08-05

## 2023-08-04 RX ORDER — METOPROLOL TARTRATE 100 MG/1
100 TABLET ORAL ONCE AS NEEDED
Status: DISCONTINUED | OUTPATIENT
Start: 2023-08-04 | End: 2023-08-05

## 2023-08-04 RX ORDER — ASPIRIN 81 MG/1
81 TABLET ORAL DAILY
Status: DISCONTINUED | OUTPATIENT
Start: 2023-08-05 | End: 2023-08-04

## 2023-08-04 RX ORDER — TRAZODONE HYDROCHLORIDE 50 MG/1
50 TABLET ORAL NIGHTLY
Status: DISCONTINUED | OUTPATIENT
Start: 2023-08-04 | End: 2023-08-05

## 2023-08-04 RX ORDER — SODIUM CHLORIDE 9 MG/ML
INJECTION, SOLUTION INTRAVENOUS
Status: DISCONTINUED | OUTPATIENT
Start: 2023-08-05 | End: 2023-08-04 | Stop reason: HOSPADM

## 2023-08-04 RX ORDER — ATORVASTATIN CALCIUM 80 MG/1
80 TABLET, FILM COATED ORAL NIGHTLY
Status: DISCONTINUED | OUTPATIENT
Start: 2023-08-04 | End: 2023-08-05

## 2023-08-04 RX ORDER — SENNOSIDES 8.6 MG
17.2 TABLET ORAL NIGHTLY PRN
Status: DISCONTINUED | OUTPATIENT
Start: 2023-08-04 | End: 2023-08-05

## 2023-08-04 RX ORDER — POTASSIUM CHLORIDE 20 MEQ/1
40 TABLET, EXTENDED RELEASE ORAL EVERY 4 HOURS
Status: COMPLETED | OUTPATIENT
Start: 2023-08-04 | End: 2023-08-04

## 2023-08-04 RX ORDER — NITROGLYCERIN 0.4 MG/1
0.4 TABLET SUBLINGUAL EVERY 5 MIN PRN
Status: DISCONTINUED | OUTPATIENT
Start: 2023-08-04 | End: 2023-08-05

## 2023-08-04 RX ORDER — NITROGLYCERIN 20 MG/100ML
INJECTION INTRAVENOUS
Status: COMPLETED
Start: 2023-08-04 | End: 2023-08-04

## 2023-08-04 RX ORDER — ACETAMINOPHEN 500 MG
1000 TABLET ORAL EVERY 4 HOURS PRN
Status: DISCONTINUED | OUTPATIENT
Start: 2023-08-04 | End: 2023-08-05

## 2023-08-04 RX ORDER — NITROGLYCERIN 0.4 MG/1
0.4 TABLET SUBLINGUAL ONCE
Status: COMPLETED | OUTPATIENT
Start: 2023-08-04 | End: 2023-08-04

## 2023-08-04 RX ORDER — HEPARIN SODIUM 5000 [USP'U]/ML
INJECTION, SOLUTION INTRAVENOUS; SUBCUTANEOUS
Status: COMPLETED
Start: 2023-08-04 | End: 2023-08-04

## 2023-08-04 RX ORDER — BISACODYL 10 MG
10 SUPPOSITORY, RECTAL RECTAL
Status: DISCONTINUED | OUTPATIENT
Start: 2023-08-04 | End: 2023-08-05

## 2023-08-04 RX ORDER — METOPROLOL TARTRATE 100 MG/1
100 TABLET ORAL ONCE
Status: DISCONTINUED | OUTPATIENT
Start: 2023-08-05 | End: 2023-08-05

## 2023-08-04 RX ORDER — METOPROLOL TARTRATE 50 MG/1
50 TABLET, FILM COATED ORAL ONCE AS NEEDED
Status: COMPLETED | OUTPATIENT
Start: 2023-08-04 | End: 2023-08-04

## 2023-08-04 RX ORDER — METOPROLOL TARTRATE 100 MG/1
100 TABLET ORAL ONCE AS NEEDED
Status: DISCONTINUED | OUTPATIENT
Start: 2023-08-05 | End: 2023-08-05

## 2023-08-04 RX ORDER — LIDOCAINE HYDROCHLORIDE 10 MG/ML
INJECTION, SOLUTION EPIDURAL; INFILTRATION; INTRACAUDAL; PERINEURAL
Status: COMPLETED
Start: 2023-08-04 | End: 2023-08-04

## 2023-08-04 RX ORDER — METOPROLOL TARTRATE 5 MG/5ML
5 INJECTION INTRAVENOUS SEE ADMIN INSTRUCTIONS
Status: DISCONTINUED | OUTPATIENT
Start: 2023-08-04 | End: 2023-08-04 | Stop reason: HOSPADM

## 2023-08-04 RX ORDER — POLYETHYLENE GLYCOL 3350 17 G/17G
17 POWDER, FOR SOLUTION ORAL DAILY PRN
Status: DISCONTINUED | OUTPATIENT
Start: 2023-08-04 | End: 2023-08-05

## 2023-08-04 RX ORDER — METOPROLOL TARTRATE 50 MG/1
50 TABLET, FILM COATED ORAL ONCE
Status: DISCONTINUED | OUTPATIENT
Start: 2023-08-05 | End: 2023-08-05

## 2023-08-04 RX ORDER — MIDAZOLAM HYDROCHLORIDE 1 MG/ML
INJECTION INTRAMUSCULAR; INTRAVENOUS
Status: COMPLETED
Start: 2023-08-04 | End: 2023-08-04

## 2023-08-04 RX ORDER — PROCHLORPERAZINE EDISYLATE 5 MG/ML
5 INJECTION INTRAMUSCULAR; INTRAVENOUS EVERY 8 HOURS PRN
Status: DISCONTINUED | OUTPATIENT
Start: 2023-08-04 | End: 2023-08-05

## 2023-08-04 NOTE — PHYSICAL THERAPY NOTE
PT order received. Chart reviewed. Per RN, pt is up ad bessy and has no IP PT needs at this time. Will sign off. If change in functional mobility status occurs, please re-order therapy services.

## 2023-08-04 NOTE — DIETARY NOTE
Clinical Nutrition    Dietitian consult received per cardiac rehab standing order. Pt to be educated by cardiac rehab staff and encouraged to attend outpatient classes taught by RD. RD available PRN.     Naila Jane MS, RD, LDN  Clinical Dietitian  Pager #: [phone]

## 2023-08-04 NOTE — PROCEDURES
[address]    Cardiac Catheterization Note    Primary Proceduralist: Komal Sweeney MD  Procedure Performed: LHC, LAD IVUS, LAD PCI  Date of Procedure: 8/4/2023   Indication: NSTEMI  Estimated blood loss: 10cc  Specimens: None    Consent obtained from the patient and documented in the paper chart, unless verbally obtained in an emergency setting. The benefits and risk of the procedure, including but not limited to infection, bleeding, myocardial infarction, stroke, vascular injury, emergency surgery, renal failure requiring dialysis and death were discussed with the patient. These complications occur in approximately 1-2% of elective procedures, but the risk may be significantly elevated in the setting of acute coronary syndrome, electrical or hemodynamic instability, multivessel disease, reduced LVEF or . The patient consented to any additional procedures performed emergently in order to address a complication or prevent medical deterioration. Viable alternatives were explained to the patient, including continuing medical therapy, with the risks incurred along that course. Procedure/Technique:    Lidocaine 1% was administered locally. Access of right radial artery obtained using Seldinger technique. Ultrasound was not used. 6 Fr Sheath was inserted. J-wire advanced into the aorta under fluoroscopy. Right coronary system engaged using 6 Fr JR4 diagnostic cath. Selective angiogram performed. The same catheter was advanced into the LV. Hemodynamics were obtained. Despite multiple attempts with 6 Fr XB 3.5 and 3.0 guides we were unable to engage the left coronary due to significant tortuosity followed by superior take off. Switched to femoral approach. Access obtained. Position confirmed on fluoroscopy. 6 Fr sheath inserted. Left coronary system engaged using 6 Fr EBU 3.0 Guide cath. . Selective angiogram performed.      Coronary Angiogram Findings:  LM: Large caliber artery giving rise to LAD & LCX. No significant stenosis. LAD: Large caliber artery giving rise to diagonal and septal branches. 95% proximal LAD stenosis. LCX: Medium to large caliber artery giving rise to OM branches. No significant stenosis. RCA: Large caliber artery giving rise to acute marginal branches, and bifurcates into RPDA & RPL. No significant stenosis. Right dominant. Luminal irregularities. Hemodynamics:  /4, LVEDP 14  /62, mean 89  No significant gradient upon Ao-LV pullback      Intervention:  6 Fr EBU 3.0 Guide used to engage LM  [employer] (LAD)  IVUS used to assess lesion and size. Soft. No calcification. Second Sin blue in diagonal for protection  ROGER 2.75 x 30mm Kalen to proximal LAD  P.E using 3.0 NC up to 20 dalia  Angiogram showed DANIEL III flow (95% > 0% stenosis)    Monitored sedation administered by the cath lab RN, and supervised throughout the procedure by myself. Total time 53 minutes. Closure: Femoral angiogram performed. Perclosure was performed and successful. No immediate complications. A/P:  95% pLAD s/p IVUS guided PCI. Luminal irreg of RCA, LCX. No LM significant stenosis. LVEDP 14  Continue DAPT. Start statin.   Lilliana Carrasquillo MD  Interventional Cardiology  [address] [place] [address]

## 2023-08-04 NOTE — IMAGING NOTE
Urszula Perez to CT Rm 4. Pt denies use of long acting nitrates like, Imdur, Cialis, Levitra and Viagra. O2 applied via nasal cannula @ 2LPM.  Positioned pt on table. Procedure explained and questions answered. Vital signs monitored and noted in Flowsheet. GFR = 109  Contrast injected followed by saline flush at 1123  Contrast = 80ml  0.9 NS flush = 72ml  Average HR = 45    Patient tolerated the procedure without complication. Denies any contrast reaction.  Report given to Kt Carrera

## 2023-08-04 NOTE — ED QUICK NOTES
Orders for admission, patient is aware of plan and ready to go upstairs. Any questions, please call ED RN Ahsan Maria at extension [phone]. Patient Covid vaccination status: Fully vaccinated     COVID Test Ordered in ED: None    COVID Suspicion at Admission: N/A    Running Infusions:  None    Mental Status/LOC at time of transport: Alert, oriented X4. Independently ambulates. Becomes short of breath with ambulation.      Other pertinent information:   CIWA score: N/A   NIH score:  N/A

## 2023-08-04 NOTE — PROGRESS NOTES
1550: Received report from cath lab. Right radial approach-unsuccessful. Successful right groin perclose. IVF @ 100mL/hr started once patient arrived back on floor. 1700: Slight groin ooze, quikclot applied. Site clean, dry and soft with no hematoma. 1730: Moderate oozing from site, pressure applied. HR dropped to 30-40's, patient reported \"feeling queasy\". BP 67/40. RRT called, orders received from Hospitalist at bedside and [facility] APN via telephone.

## 2023-08-04 NOTE — PLAN OF CARE
Received patient via cart from the ER. Alert and oriented x4. Telemetry monitor reading SRRosario DICKEY. Fall precautions maintained per protocol. Plan for ECHO, troponins, and ?stress test. Call light in reach at bedside.

## 2023-08-04 NOTE — PLAN OF CARE
Pt declines complaints of pain, malaise, or cardiac symptoms. A&Ox4, lungs clear with equal expansion, on rm air. Pt in NSR, on monitor. Abdomen soft and non-tender with active bowel sounds in all 4 quadrants, continent of B&B. Coronary CTA ordered. Patient updated with plan of care.    Problem: Patient/Family Goals  Goal: Patient/Family Long Term Goal  Description: Patient's Long Term Goal: \"go home\"    Interventions:  -CT angio results  -Echo  - See additional Care Plan goals for specific interventions  Outcome: Progressing  Goal: Patient/Family Short Term Goal  Description: Patient's Short Term Goal: \"stay out of hospital\"    Interventions:   -Labs  -Follow up appts  - See additional Care Plan goals for specific interventions  Outcome: Progressing     Problem: PAIN - ADULT  Goal: Verbalizes/displays adequate comfort level or patient's stated pain goal  Description: INTERVENTIONS:  - Encourage pt to monitor pain and request assistance  - Assess pain using appropriate pain scale  - Administer analgesics based on type and severity of pain and evaluate response  - Implement non-pharmacological measures as appropriate and evaluate response  - Consider cultural and social influences on pain and pain management  - Manage/alleviate anxiety  - Utilize distraction and/or relaxation techniques  - Monitor for opioid side effects  - Notify MD/LIP if interventions unsuccessful or patient reports new pain  - Anticipate increased pain with activity and pre-medicate as appropriate  Outcome: Progressing     Problem: RISK FOR INFECTION - ADULT  Goal: Absence of fever/infection during anticipated neutropenic period  Description: INTERVENTIONS  - Monitor WBC  - Administer growth factors as ordered  - Implement neutropenic guidelines  Outcome: Progressing     Problem: SAFETY ADULT - FALL  Goal: Free from fall injury  Description: INTERVENTIONS:  - Assess pt frequently for physical needs  - Identify cognitive and physical deficits and behaviors that affect risk of falls.   - [place] fall precautions as indicated by assessment.  - Educate pt/family on patient safety including physical limitations  - Instruct pt to call for assistance with activity based on assessment  - Modify environment to reduce risk of injury  - Provide assistive devices as appropriate  - Consider OT/PT consult to assist with strengthening/mobility  - Encourage toileting schedule  Outcome: Progressing     Problem: DISCHARGE PLANNING  Goal: Discharge to home or other facility with appropriate resources  Description: INTERVENTIONS:  - Identify barriers to discharge w/pt and caregiver  - Include patient/family/discharge partner in discharge planning  - Arrange for needed discharge resources and transportation as appropriate  - Identify discharge learning needs (meds, wound care, etc)  - Arrange for interpreters to assist at discharge as needed  - Consider post-discharge preferences of patient/family/discharge partner  - Complete POLST form as appropriate  - Assess patient's ability to be responsible for managing their own health  - Refer to Case Management Department for coordinating discharge planning if the patient needs post-hospital services based on physician/LIP order or complex needs related to functional status, cognitive ability or social support system  Outcome: Progressing

## 2023-08-04 NOTE — PLAN OF CARE
Brianne Hernandez Patient Status:  Emergency    3/2/1980 MRN [id number]   Location [address] [facility] Attending Eve Lei, DO   Hosp Day # 0 PCP Claudene Junker, MD       Cardiology Nocturnal APN Note    Page Received: 8321    HPI:     Patient is a 37year old female with PMH of mixed hyperlipidemia, cervical radiculopathy, herniated disc. Presented to ED with complaints of exertional chest pain and SOB for the past 6 weeks. Pt has been noting to have chest pain after walking on treadmill for 5 minutes. Currently no active chest pain per ER provider. In ED pt was noted with trop of 57, EKG ST without any acute ST changes. No previous cardiology follow up noted. [facility] was consulted for the chest pain. HPI obtained from chart review and information provided by ER provider. Vital Signs:       8/3/2023     9:00 PM 8/3/2023     9:40 PM   Vitals History   /102 136/93   Pulse 81 78   Resp 23 20   SpO2 100 % 99 %        Labs:   Lab Results   Component Value Date    WBC 9.5 2023    HGB 12.5 2023    HCT 36.7 2023    .0 2023    CREATSERUM 0.80 2023    BUN 13 2023     2023    K 3.9 2023     2023    CO2 26.0 2023    GLU 99 2023    CA 8.9 2023    ALB 3.7 2023    ALKPHO 64 2023    BILT 0.3 2023    TP 7.4 2023    AST 17 2023    ALT 31 2023    DDIMER 0.36 2023    MG 2.1 2023    TROPHS 57 2023       Diagnostics:   XR CHEST AP PORTABLE  (CPT=71045)    Result Date: 8/3/2023  CONCLUSION: No acute cardiopulmonary abnormality. LOCATION:  [facility]      Dictated by (CST): Kassandra Marmolejo MD on 2023 at 7:21 PM     Finalized by (CST): Kassandra Marmolejo MD on 2023 at 7:21 PM        Allergies:  No Known Allergies    Medications:  No current facility-administered medications for this encounter.        Assessment/Plan:    - trend trop  -echo in am  -continuous tele monitoring  - Continue to monitor overnight  - Formal Cardiology consult to follow in AM.     Elisa Opal [address], [address]  8/3/2023  10:28 PM

## 2023-08-04 NOTE — ED QUICK NOTES
Patient sent from PCP office. Patient states she's had reoccurring episodes of left arm/shoulder pain that radiates into chest and back. Describes it as a pressure in her upper left up and a heaviness in her chest. Feels short of breath with minimal exertion. States pain wakes her up at night. Last plane travel to [facility] about 2 weeks ago. No history of blood clots.

## 2023-08-05 VITALS
HEART RATE: 88 BPM | OXYGEN SATURATION: 98 % | RESPIRATION RATE: 14 BRPM | HEIGHT: 63 IN | BODY MASS INDEX: 28.7 KG/M2 | TEMPERATURE: 98 F | DIASTOLIC BLOOD PRESSURE: 75 MMHG | SYSTOLIC BLOOD PRESSURE: 111 MMHG | WEIGHT: 162 LBS

## 2023-08-05 LAB
ANION GAP SERPL CALC-SCNC: 3 MMOL/L (ref 0–18)
ATRIAL RATE: 61 BPM
BUN BLD-MCNC: 12 MG/DL (ref 7–18)
CALCIUM BLD-MCNC: 8.6 MG/DL (ref 8.5–10.1)
CHLORIDE SERPL-SCNC: 110 MMOL/L (ref 98–112)
CO2 SERPL-SCNC: 24 MMOL/L (ref 21–32)
CREAT BLD-MCNC: 0.64 MG/DL
EGFRCR SERPLBLD CKD-EPI 2021: 111 ML/MIN/1.73M2 (ref 60–?)
ERYTHROCYTE [DISTWIDTH] IN BLOOD BY AUTOMATED COUNT: 13.8 %
GLUCOSE BLD-MCNC: 105 MG/DL (ref 70–99)
HCT VFR BLD AUTO: 31.2 %
HGB BLD-MCNC: 9.9 G/DL
MCH RBC QN AUTO: 27.3 PG (ref 26–34)
MCHC RBC AUTO-ENTMCNC: 31.7 G/DL (ref 31–37)
MCV RBC AUTO: 86 FL
OSMOLALITY SERPL CALC.SUM OF ELEC: 284 MOSM/KG (ref 275–295)
P AXIS: 39 DEGREES
P-R INTERVAL: 200 MS
PLATELET # BLD AUTO: 204 10(3)UL (ref 150–450)
POTASSIUM SERPL-SCNC: 4 MMOL/L (ref 3.5–5.1)
POTASSIUM SERPL-SCNC: 4.2 MMOL/L (ref 3.5–5.1)
Q-T INTERVAL: 486 MS
QRS DURATION: 76 MS
QTC CALCULATION (BEZET): 489 MS
R AXIS: 62 DEGREES
RBC # BLD AUTO: 3.63 X10(6)UL
SODIUM SERPL-SCNC: 137 MMOL/L (ref 136–145)
T AXIS: 192 DEGREES
VENTRICULAR RATE: 61 BPM
WBC # BLD AUTO: 8.2 X10(3) UL (ref 4–11)

## 2023-08-05 PROCEDURE — 99239 HOSP IP/OBS DSCHRG MGMT >30: CPT | Performed by: HOSPITALIST

## 2023-08-05 RX ORDER — ASPIRIN 81 MG/1
81 TABLET ORAL DAILY
Qty: 30 TABLET | Refills: 11 | Status: SHIPPED | OUTPATIENT
Start: 2023-08-05

## 2023-08-05 RX ORDER — ATORVASTATIN CALCIUM 80 MG/1
80 TABLET, FILM COATED ORAL NIGHTLY
Qty: 30 TABLET | Refills: 3 | Status: SHIPPED | OUTPATIENT
Start: 2023-08-05

## 2023-08-05 RX ORDER — PRASUGREL 10 MG/1
10 TABLET, FILM COATED ORAL DAILY
Qty: 30 TABLET | Refills: 6 | Status: SHIPPED | OUTPATIENT
Start: 2023-08-06

## 2023-08-05 RX ORDER — METOPROLOL SUCCINATE 25 MG/1
25 TABLET, EXTENDED RELEASE ORAL EVERY EVENING
Qty: 30 TABLET | Refills: 3 | Status: SHIPPED | OUTPATIENT
Start: 2023-08-05

## 2023-08-05 NOTE — PLAN OF CARE
Patient alert and oriented x 4. On room air. Sinus rhythm  on cardiac monitor. Patient denies any chest pain or chest discomfort at this time. Patient voids, last BM 8/4 ,s/p cardiac cath , rt groin site intact , no further bleeding, bedrest over , assisted to brp  ambulated,no acute distress noted,  all questions answered. Safety precautions in place. Bed alarm on. Will continue to monitor tele/labs/vital signs closely. CT Abdomen / pelvis done in this shift and result  normal updated to patient,  potassium replaced and repeated lab within normal    Plan: DISCHARGE PLAN   Monitor labs, v/s, cardiology will see    Problem: CARDIOVASCULAR - ADULT  Goal: Maintains optimal cardiac output and hemodynamic stability  Description: INTERVENTIONS:  - Monitor vital signs, rhythm, and trends  - Monitor for bleeding, hypotension and signs of decreased cardiac output  - Evaluate effectiveness of vasoactive medications to optimize hemodynamic stability  - Monitor arterial and/or venous puncture sites for bleeding and/or hematoma  - Assess quality of pulses, skin color and temperature  - Assess for signs of decreased coronary artery perfusion - ex.  Angina  - Evaluate fluid balance, assess for edema, trend weights  Outcome: Progressing  Goal: Absence of cardiac arrhythmias or at baseline  Description: INTERVENTIONS:  - Continuous cardiac monitoring, monitor vital signs, obtain 12 lead EKG if indicated  - Evaluate effectiveness of antiarrhythmic and heart rate control medications as ordered  - Initiate emergency measures for life threatening arrhythmias  - Monitor electrolytes and administer replacement therapy as ordered  Outcome: Progressing     Problem: Patient/Family Goals  Goal: Patient/Family Long Term Goal  Description: Patient's Long Term Goal: \"go home\"    Interventions:  -CT angio results  -Echo  - See additional Care Plan goals for specific interventions  8/5/2023 0224 by Lulú Campoverde RN  Outcome: Progressing  8/5/2023 0223 by Riley Galloway RN  Outcome: Progressing  Goal: Patient/Family Short Term Goal  Description: Patient's Short Term Goal: \"stay out of hospital\"    Interventions:   -Labs  -Follow up appts  - See additional Care Plan goals for specific interventions  8/5/2023 0224 by Riley Galloway RN  Outcome: Progressing  8/5/2023 0223 by Riley Galloway RN  Outcome: Progressing     Problem: PAIN - ADULT  Goal: Verbalizes/displays adequate comfort level or patient's stated pain goal  Description: INTERVENTIONS:  - Encourage pt to monitor pain and request assistance  - Assess pain using appropriate pain scale  - Administer analgesics based on type and severity of pain and evaluate response  - Implement non-pharmacological measures as appropriate and evaluate response  - Consider cultural and social influences on pain and pain management  - Manage/alleviate anxiety  - Utilize distraction and/or relaxation techniques  - Monitor for opioid side effects  - Notify MD/LIP if interventions unsuccessful or patient reports new pain  - Anticipate increased pain with activity and pre-medicate as appropriate  8/5/2023 0224 by Riley Galloway RN  Outcome: Progressing  8/5/2023 0223 by Riley Galloway RN  Outcome: Progressing     Problem: RISK FOR INFECTION - ADULT  Goal: Absence of fever/infection during anticipated neutropenic period  Description: INTERVENTIONS  - Monitor WBC  - Administer growth factors as ordered  - Implement neutropenic guidelines  8/5/2023 0224 by Riley Galloway RN  Outcome: Progressing  8/5/2023 0223 by Riley Galloway RN  Outcome: Progressing     Problem: SAFETY ADULT - FALL  Goal: Free from fall injury  Description: INTERVENTIONS:  - Assess pt frequently for physical needs  - Identify cognitive and physical deficits and behaviors that affect risk of falls.   - [place] fall precautions as indicated by assessment.  - Educate pt/family on patient safety including physical limitations  - Instruct pt to call for assistance with activity based on assessment  - Modify environment to reduce risk of injury  - Provide assistive devices as appropriate  - Consider OT/PT consult to assist with strengthening/mobility  - Encourage toileting schedule  8/5/2023 0224 by Corinne Lown, RN  Outcome: Progressing  8/5/2023 0223 by Corinne Lown, RN  Outcome: Progressing     Problem: METABOLIC/FLUID AND ELECTROLYTES - ADULT  Goal: Hemodynamic stability and optimal renal function maintained  Description: INTERVENTIONS:  - Monitor labs and assess for signs and symptoms of volume excess or deficit  - Monitor intake, output and patient weight  - Monitor urine specific gravity, serum osmolarity and serum sodium as indicated or ordered  - Monitor response to interventions for patient's volume status, including labs, urine output, blood pressure (other measures as available)  - Encourage oral intake as appropriate  - Instruct patient on fluid and nutrition restrictions as appropriate  Outcome: Progressing     Problem: HEMATOLOGIC - ADULT  Goal: Maintains hematologic stability  Description: INTERVENTIONS  - Assess for signs and symptoms of bleeding or hemorrhage  - Monitor labs and vital signs for trends  - Administer supportive blood products/factors, fluids and medications as ordered and appropriate  - Administer supportive blood products/factors as ordered and appropriate  Outcome: Progressing  Goal: Free from bleeding injury  Description: (Example usage: patient with low platelets)  INTERVENTIONS:  - Avoid intramuscular injections, enemas and rectal medication administration  - Ensure safe mobilization of patient  - Hold pressure on venipuncture sites to achieve adequate hemostasis  - Assess for signs and symptoms of internal bleeding  - Monitor lab trends  - Patient is to report abnormal signs of bleeding to staff  - Avoid use of toothpicks and dental floss  - Use electric shaver for shaving  - Use soft bristle tooth brush  - Limit straining and forceful nose blowing  Outcome: Progressing

## 2023-08-05 NOTE — PLAN OF CARE
Patient cleared for discharge. Patient IV and tele removed. Patient discharge education  completed. Patient verbalized understanding. Patient r Radial and R groin sites clean dry and intact, soft no hematoma. Patient denies chest pain wheeled down by staff to [facility].

## 2023-08-05 NOTE — PLAN OF CARE
Assumed care of patient @ 0730. Patient is up standby assist no complaints of pain. Patient is normal sinus rhythm on room air, no chest pain, no shortness of breath. R radial and R groin clean dry and intact, no drainage, soft no hematoma. Slight ecchymosis to right groin. Patient is continent of bladder and bowel, last bm 8/4. Fall precautions in place, call light and belongings within reach, all needs met by staff.        Problem: Patient/Family Goals  Goal: Patient/Family Long Term Goal  Description: Patient's Long Term Goal: \"go home\"    Interventions:  -CT angio results  -Echo  - See additional Care Plan goals for specific interventions  Outcome: Progressing  Goal: Patient/Family Short Term Goal  Description: Patient's Short Term Goal: \"stay out of hospital\"    Interventions:   -Labs  -Follow up appts  - See additional Care Plan goals for specific interventions  Outcome: Progressing     Problem: PAIN - ADULT  Goal: Verbalizes/displays adequate comfort level or patient's stated pain goal  Description: INTERVENTIONS:  - Encourage pt to monitor pain and request assistance  - Assess pain using appropriate pain scale  - Administer analgesics based on type and severity of pain and evaluate response  - Implement non-pharmacological measures as appropriate and evaluate response  - Consider cultural and social influences on pain and pain management  - Manage/alleviate anxiety  - Utilize distraction and/or relaxation techniques  - Monitor for opioid side effects  - Notify MD/LIP if interventions unsuccessful or patient reports new pain  - Anticipate increased pain with activity and pre-medicate as appropriate  Outcome: Progressing     Problem: RISK FOR INFECTION - ADULT  Goal: Absence of fever/infection during anticipated neutropenic period  Description: INTERVENTIONS  - Monitor WBC  - Administer growth factors as ordered  - Implement neutropenic guidelines  Outcome: Progressing     Problem: SAFETY ADULT - FALL  Goal: Free from fall injury  Description: INTERVENTIONS:  - Assess pt frequently for physical needs  - Identify cognitive and physical deficits and behaviors that affect risk of falls.   - [place] fall precautions as indicated by assessment.  - Educate pt/family on patient safety including physical limitations  - Instruct pt to call for assistance with activity based on assessment  - Modify environment to reduce risk of injury  - Provide assistive devices as appropriate  - Consider OT/PT consult to assist with strengthening/mobility  - Encourage toileting schedule  Outcome: Progressing     Problem: DISCHARGE PLANNING  Goal: Discharge to home or other facility with appropriate resources  Description: INTERVENTIONS:  - Identify barriers to discharge w/pt and caregiver  - Include patient/family/discharge partner in discharge planning  - Arrange for needed discharge resources and transportation as appropriate  - Identify discharge learning needs (meds, wound care, etc)  - Arrange for interpreters to assist at discharge as needed  - Consider post-discharge preferences of patient/family/discharge partner  - Complete POLST form as appropriate  - Assess patient's ability to be responsible for managing their own health  - Refer to Case Management Department for coordinating discharge planning if the patient needs post-hospital services based on physician/LIP order or complex needs related to functional status, cognitive ability or social support system  Outcome: Progressing     Problem: CARDIOVASCULAR - ADULT  Goal: Maintains optimal cardiac output and hemodynamic stability  Description: INTERVENTIONS:  - Monitor vital signs, rhythm, and trends  - Monitor for bleeding, hypotension and signs of decreased cardiac output  - Evaluate effectiveness of vasoactive medications to optimize hemodynamic stability  - Monitor arterial and/or venous puncture sites for bleeding and/or hematoma  - Assess quality of pulses, skin color and temperature  - Assess for signs of decreased coronary artery perfusion - ex.  Angina  - Evaluate fluid balance, assess for edema, trend weights  Outcome: Progressing  Goal: Absence of cardiac arrhythmias or at baseline  Description: INTERVENTIONS:  - Continuous cardiac monitoring, monitor vital signs, obtain 12 lead EKG if indicated  - Evaluate effectiveness of antiarrhythmic and heart rate control medications as ordered  - Initiate emergency measures for life threatening arrhythmias  - Monitor electrolytes and administer replacement therapy as ordered  Outcome: Progressing     Problem: RESPIRATORY - ADULT  Goal: Achieves optimal ventilation and oxygenation  Description: INTERVENTIONS:  - Assess for changes in respiratory status  - Assess for changes in mentation and behavior  - Position to facilitate oxygenation and minimize respiratory effort  - Oxygen supplementation based on oxygen saturation or ABGs  - Provide Smoking Cessation handout, if applicable  - Encourage broncho-pulmonary hygiene including cough, deep breathe, Incentive Spirometry  - Assess the need for suctioning and perform as needed  - Assess and instruct to report SOB or any respiratory difficulty  - Respiratory Therapy support as indicated  - Manage/alleviate anxiety  - Monitor for signs/symptoms of CO2 retention  Outcome: Progressing     Problem: GASTROINTESTINAL - ADULT  Goal: Minimal or absence of nausea and vomiting  Description: INTERVENTIONS:  - Maintain adequate hydration with IV or PO as ordered and tolerated  - Nasogastric tube to low intermittent suction as ordered  - Evaluate effectiveness of ordered antiemetic medications  - Provide nonpharmacologic comfort measures as appropriate  - Advance diet as tolerated, if ordered  - Obtain nutritional consult as needed  - Evaluate fluid balance  Outcome: Progressing  Goal: Maintains or returns to baseline bowel function  Description: INTERVENTIONS:  - Assess bowel function  - Maintain adequate hydration with IV or PO as ordered and tolerated  - Evaluate effectiveness of GI medications  - Encourage mobilization and activity  - Obtain nutritional consult as needed  - Establish a toileting routine/schedule  - Consider collaborating with pharmacy to review patient's medication profile  Outcome: Progressing  Goal: Maintains adequate nutritional intake (undernourished)  Description: INTERVENTIONS:  - Monitor percentage of each meal consumed  - Identify factors contributing to decreased intake, treat as appropriate  - Assist with meals as needed  - Monitor I&O, WT and lab values  - Obtain nutritional consult as needed  - Optimize oral hygiene and moisture  - Encourage food from home; allow for food preferences  - Enhance eating environment  Outcome: Progressing  Goal: Achieves appropriate nutritional intake (bariatric)  Description: INTERVENTIONS:  - Monitor for over-consumption  - Identify factors contributing to increased intake, treat as appropriate  - Monitor I&O, WT and lab values  - Obtain nutritional consult as needed  - Evaluate psychosocial factors contributing to over-consumption  Outcome: Progressing     Problem: METABOLIC/FLUID AND ELECTROLYTES - ADULT  Goal: Hemodynamic stability and optimal renal function maintained  Description: INTERVENTIONS:  - Monitor labs and assess for signs and symptoms of volume excess or deficit  - Monitor intake, output and patient weight  - Monitor urine specific gravity, serum osmolarity and serum sodium as indicated or ordered  - Monitor response to interventions for patient's volume status, including labs, urine output, blood pressure (other measures as available)  - Encourage oral intake as appropriate  - Instruct patient on fluid and nutrition restrictions as appropriate  Outcome: Progressing     Problem: SKIN/TISSUE INTEGRITY - ADULT  Goal: Skin integrity remains intact  Description: INTERVENTIONS  - Assess and document risk factors for pressure ulcer development  - Assess and document skin integrity  - Monitor for areas of redness and/or skin breakdown  - Initiate interventions, skin care algorithm/standards of care as needed  Outcome: Progressing  Goal: Incision(s), wounds(s) or drain site(s) healing without S/S of infection  Description: INTERVENTIONS:  - Assess and document risk factors for pressure ulcer development  - Assess and document skin integrity  - Assess and document dressing/incision, wound bed, drain sites and surrounding tissue  - Implement wound care per orders  - Initiate isolation precautions as appropriate  - Initiate Pressure Ulcer prevention bundle as indicated  Outcome: Progressing     Problem: HEMATOLOGIC - ADULT  Goal: Maintains hematologic stability  Description: INTERVENTIONS  - Assess for signs and symptoms of bleeding or hemorrhage  - Monitor labs and vital signs for trends  - Administer supportive blood products/factors, fluids and medications as ordered and appropriate  - Administer supportive blood products/factors as ordered and appropriate  Outcome: Progressing  Goal: Free from bleeding injury  Description: (Example usage: patient with low platelets)  INTERVENTIONS:  - Avoid intramuscular injections, enemas and rectal medication administration  - Ensure safe mobilization of patient  - Hold pressure on venipuncture sites to achieve adequate hemostasis  - Assess for signs and symptoms of internal bleeding  - Monitor lab trends  - Patient is to report abnormal signs of bleeding to staff  - Avoid use of toothpicks and dental floss  - Use electric shaver for shaving  - Use soft bristle tooth brush  - Limit straining and forceful nose blowing  Outcome: Progressing

## 2023-08-07 ENCOUNTER — PATIENT OUTREACH (OUTPATIENT)
Dept: CASE MANAGEMENT | Age: 43
End: 2023-08-07

## 2023-08-07 DIAGNOSIS — E78.5 HYPERLIPIDEMIA, UNSPECIFIED HYPERLIPIDEMIA TYPE: ICD-10-CM

## 2023-08-07 DIAGNOSIS — R07.9 ACUTE CHEST PAIN: ICD-10-CM

## 2023-08-07 DIAGNOSIS — Z02.9 ENCOUNTERS FOR UNSPECIFIED ADMINISTRATIVE PURPOSE: Primary | ICD-10-CM

## 2023-08-07 DIAGNOSIS — R77.8 ELEVATED TROPONIN: ICD-10-CM

## 2023-08-07 DIAGNOSIS — R06.09 DYSPNEA ON EXERTION: ICD-10-CM

## 2023-08-07 DIAGNOSIS — E78.1 HYPERTRIGLYCERIDEMIA: ICD-10-CM

## 2023-08-07 DIAGNOSIS — Z98.890 STATUS POST CARDIAC CATHETERIZATION: ICD-10-CM

## 2023-08-07 DIAGNOSIS — D64.9 NORMOCYTIC ANEMIA: ICD-10-CM

## 2023-08-15 ENCOUNTER — TELEPHONE (OUTPATIENT)
Dept: FAMILY MEDICINE CLINIC | Facility: CLINIC | Age: 43
End: 2023-08-15

## 2023-08-15 NOTE — TELEPHONE ENCOUNTER
LAUREN FROM [facility] CALLING TO ASK IF DR. NELSON WILL SIGN DEATH CERTIFICATE?   PT PASSED AWAY THIS MORNING AT RESIDENCE    PLEASE CALL BACK

## 2023-08-15 NOTE — TELEPHONE ENCOUNTER
Sure,  spoke to Nasima Rahman  [phone] on 8/15/2023. Patient had complained of abdominal cramps last night.  called cardiology. Started her menstrual cycle. Blood pressure was reportedly normal.  Cardiology told her it was okay to stay home if symptoms worsen to go to the ER.  found her  on the bathroom floor this morning.

## 2023-08-21 ENCOUNTER — ORDER TRANSCRIPTION (OUTPATIENT)
Dept: CARDIAC REHAB | Facility: HOSPITAL | Age: 43
End: 2023-08-21

## 2023-08-21 DIAGNOSIS — Z95.5 STENTED CORONARY ARTERY: Primary | ICD-10-CM

## (undated) NOTE — MR AVS SNAPSHOT
[address]  [address] [address] [phone]               Thank you for choosing us for your health care visit with Janette Gaming MD.  We are glad to serve you and happy to provide you with this Assoc Dx: Irregular menses [N92.6]           LH (Luteinizing Hormone) [E]    Complete by:  Jun 23, 2017 (Approximate)    Assoc Dx: Irregular menses [N92.6]           Progesterone [E]    Complete by:  Jun 23, 2017 (Approximate)    Assoc Dx:   Irregular me office, you can view your past visit information in [employer] by going to Visits < Visit Summaries. [employer] questions? Call [phone] for help. [employer] is NOT to be used for urgent needs. For medical emergencies, dial 911.            Visit [facility]

## (undated) NOTE — LETTER
09/06/17        Urszula Perez  [id number] [facility]      Dear Lorena Joyce records indicate that you have outstanding lab work and or testing that was ordered for you and has not yet been completed:          Lipid Panel [E]  To pro

## (undated) NOTE — MR AVS SNAPSHOT
[address]  [address] [facility], [address] [address] [phone]               Thank you for choosing us for your health care visit with Dylon Cruz MD.  We are glad to serve you and happy to provide you with this Physical Therapy Referral - [facility] Location    Complete by:  As directed    Assoc Dx:  Cervical radiculopathy at C6 [M54.12]                 Referral Details     Referred By    Referred To    Nandini Musa MD    [address] [facility] [address] [facility] [address] Assoc Dx:  Cervical radiculopathy at C6 [M54.12]          Reason for Today's Visit     Physical           Medical Issues Discussed Today     Cervical radiculopathy at C6    Hypertriglyceridemia    Hypovitaminosis D    Insomnia    Routine general medical e Summaries. If you've been to the Emergency Department or your doctor's office, you can view your past visit information in [employer] by going to Visits < Visit Summaries. [employer] questions? Call [phone] for help.   [employer] is NOT to be used for urge

## (undated) NOTE — LETTER
Patient Name: Vinnie Johnson  YOB: 1980          MRN number:  [id number]  Date:  8/15/2017  Referring Physician:  Erick Mejia        Dear Dr. Alivia Ibrahim,     Discharge Summary    Pt has attended 12, cancelled 2, and no shown 0 visits in [address] [place] [address] tolerance for ADL such as turning head to check blind spot while driving (8 visits)- met  · Pt will have improved thoracic PA mobility to WNL to improve cervical ROM as well as promote upright posturing and decreased pain with looking up and reaching overh

## (undated) NOTE — MR AVS SNAPSHOT
[facility] HEART AND SURGICAL [facility] [place]  [address]  [phone]               Thank you for choosing us for your health care visit with Madelin Sewlel PT.   We are glad to serve you and happy to provide you with this summ TraZODone HCl 50 MG Tabs   TAKE 1 TABLET BY MOUTH AT BEDTIME   Commonly known as:  Steven Fraser                   MyChart     Visit MyChart  You can access your MyChart to more actively manage your health care and view more details from this visit by going to

## (undated) NOTE — MR AVS SNAPSHOT
[facility] HEART AND SURGICAL [facility]  [address]  [phone]               Thank you for choosing us for your health care visit with Ej Farrell PT.   We are glad to serve you and happy to provide you with this summ Summaries. If you've been to the Emergency Department or your doctor's office, you can view your past visit information in [employer] by going to Visits < Visit Summaries. [employer] questions? Call [phone] for help.   [employer] is NOT to be used for urge

## (undated) NOTE — MR AVS SNAPSHOT
[facility] HEART AND SURGICAL [facility]  [address]  [phone]               Thank you for choosing us for your health care visit with Meaghan Mancilla PT.   We are glad to serve you and happy to provide you with this summ [employer] [facility] [place] HEART AND SURGICAL [facility])    [address]   [phone]            Kvng 15, 2017  7:45 AM   PT VISIT BY THERAPIST with Delon Dsouza PT   [employer] [facility] [place]

## (undated) NOTE — MR AVS SNAPSHOT
[facility] HEART AND SURGICAL [facility]  [address]  [phone]               Thank you for choosing us for your health care visit with Amanda Morris PT.   We are glad to serve you and happy to provide you with this summ view more details from this visit by going to [email or web address]. If you've recently had a stay at the Hospital you can access your discharge instructions in [employer] by going to Visits < Admission Summaries.  If you've been to the Emergency Depar

## (undated) NOTE — LETTER
07/01/19        [address] [address]      Dear Meredith Womack records indicate that you have outstanding lab work and or testing that was ordered for you and has not yet been completed:  Orders Placed This Encount

## (undated) NOTE — LETTER
23    Patient: Liza Martinez  : 3/2/1980 Visit date: 2023    Dear  Tri Dove MD    Thank you for referring Liza Martinez to my practice. Please find my assessment and plan below. History of Present Illness       49-year-old female who is here for evaluation bilateral axillary hidradenitis    The patient reports these lesions started a few weeks ago. She denies any change in her personal hygiene products or routine. She has had 1 or 2 tender nodules which have spontaneously resolved. On today's examination there is a 1 cm area of chronic scarring in the left axilla. Bilaterally there is no drainable abscess. We did discuss management of hidradenitis including skin care, avoidance of razors to prevent folliculitis, antibacterial soaps. Should a lesion arise, warm compresses should be applied and the patient may require oral antibiotics. No indication for surgical intervention at this time. We did discuss that he hidradenitis axillaris is a chronic problem which may require intermittent use of antibiotics and occasional surgery    The patient does not have any further questions at this time we will follow-up with me as needed        Assessment  Hidradenitis axillaris  (primary encounter diagnosis)        Plan     No indication for surgical intervention at this time. The patient will follow-up with me as needed.   She will begin a system of skin care to minimize outbreaks or abscesses            Sincerely,       Guilherme Casey MD   CC: No Recipients

## (undated) NOTE — LETTER
Patient Name: Grace Baker  YOB: 1980          MRN :  [id number]  Date:  12/2/2021  Referring Physician:  Gregoria Blanc    SPINE EVALUATION:   Referring Physician: Dr. Aris Goff  Diagnosis: Foraminal stenosis of cervical region (M48.02)  Radicu with primary c/o neck, right shoulder and hand pain. The results of the objective tests and measures show impaired posture, flexibility, ROM, joint mobility, muscle performance and motor function.   Functional deficits include but are not limited to difficu education was provided on exam findings, treatment diagnosis, treatment plan, expectations, and prognosis.  Pt was also provided recommendations for activity modifications, possible soreness after evaluation, postural corrections, ergonomics and importance [phone].  If you have any questions, please contact me at Dept: [phone]    Sincerely,  Electronically signed by therapist: Yelena Phillips, PT    [de-identified] certification required: Yes  I certify the need for these services furnished under this milena

## (undated) NOTE — MR AVS SNAPSHOT
[facility]  [address]  [phone]               Thank you for choosing us for your health care visit with Ayesha Jeffrey PT.   We are glad to serve you and happy to provide you with this summ Fenofibrate 54 MG Tabs   Take 1 tablet (54 mg total) by mouth daily.            TraZODone HCl 50 MG Tabs   TAKE 1 TABLET BY MOUTH AT BEDTIME   Commonly known as:  [facility]                   MyChart     Visit Intentio  You can access your MyChart to more acti

## (undated) NOTE — LETTER
Patient Name: Ez Butt  YOB: 1980          MRN :  [id number]  Date:  1/11/2022  Referring Physician:  Sadaf Chen    Progress Summary  Pt has attended 10 visits in Physical Therapy.  Urszula reports feeling 50% improvement in neck and R Progress      Rehab Potential: good    Plan: Continue skilled Physical Therapy POC 2 x/week or a total of 8 more visits over a 90 day period.        Patient/Family/Caregiver was advised of these findings, precautions, and treatment options and has agreed to

## (undated) NOTE — MR AVS SNAPSHOT
[facility] HEART AND SURGICAL hospitals  [address]  [phone]               Thank you for choosing us for your health care visit with Bonita Tinsley PT.   We are glad to serve you and happy to provide you with this summ Summaries. If you've been to the Emergency Department or your doctor's office, you can view your past visit information in [employer] by going to Visits < Visit Summaries. [employer] questions? Call [phone] for help.   [employer] is NOT to be used for urge

## (undated) NOTE — MR AVS SNAPSHOT
[facility] HEART AND SURGICAL [facility]  [address]  [phone]               Thank you for choosing us for your health care visit with Delicia Holm PT.   We are glad to serve you and happy to provide you with this summ Call [phone] for help. [email or web address] is NOT to be used for urgent needs. For medical emergencies, dial 911.            Visit [facility] online at  [email or web address]

## (undated) NOTE — LETTER
[facility] [address], [address] [place] [address]  Consent for Procedure/Sedation  Date: 08/04/2023        Time: 1330    I hereby authorize [facility], my physician and his/her assistants (if applicable), which may include medical students, residents, and/or fellows, to perform the following surgical operation/ procedure and administer such anesthesia as may be determined necessary by my physician:  Operation/Procedure name (s)  Cardiac Catheterization, Left Ventricular Cineangiography, Bilateral Selective Coronary Angiography and/or Right Heart Catheterization; possible Percutaneous Transluminal Coronary Angioplasty, Coronary Atherectomy, Coronary Stent, Intracoronary Thrombolytic therapy, Antiplatelet therapy and/or Intravascular Ultrasound on [facility]   2.   I recognize that during the surgical operation/procedure, unforeseen conditions may necessitate additional or different procedures than those listed above. I, therefore, further authorize and request that the above-named surgeon, assistants, or designees perform such procedures as are, in their judgment, necessary and desirable. 3.   My surgeon/physician has discussed prior to my surgery the potential benefits, risks and side effects of this procedure; the likelihood of achieving goals; and potential problems that might occur during recuperation. They also discussed reasonable alternatives to the procedure, including risks, benefits, and side effects related to the alternatives and risks related to not receiving this procedure. I have had all my questions answered and I acknowledge that no guarantee has been made as to the result that may be obtained. 4.   Should the need arise during my operation/procedure, which includes change of level of care prior to discharge, I also consent to the administration of blood and/or blood products.   Further, I understand that despite careful testing and screening of blood or blood products by collecting agencies, I may still be subject to ill effects as a result of receiving a blood transfusion and/or blood products. The following are some, but not all, of the potential risks that can occur: fever and allergic reactions, hemolytic reactions, transmission of diseases such as Hepatitis, AIDS and Cytomegalovirus (CMV) and fluid overload. In the event that I wish to have an autologous transfusion of my own blood, or a directed donor transfusion, I will discuss this with my physician. Check only if Refusing Blood or Blood Products  I understand refusal of blood or blood products as deemed necessary by my physician may have serious consequences to my condition to include possible death. I hereby assume responsibility for my refusal and release the hospital, its personnel, and my physicians from any responsibility for the consequences of my refusal.          o  Refuse      5. I authorize the use of any specimen, organs, tissues, body parts or foreign objects that may be removed from my body during the operation/procedure for diagnosis, research or teaching purposes and their subsequent disposal by hospital authorities. I also authorize the release of specimen test results and/or written reports to my treating physician on the hospital medical staff or other referring or consulting physicians involved in my care, at the discretion of the Pathologist or my treating physician. 6.   I consent to the photographing or videotaping of the operations or procedures to be performed, including appropriate portions of my body for medical, scientific, or educational purposes, provided my identity is not revealed by the pictures or by descriptive texts accompanying them. If the procedure has been photographed/videotaped, the surgeon will obtain the original picture, image, videotape or CD. The hospital will not be responsible for storage, release or maintenance of the picture, image, tape or CD. 7.   I consent to the presence of a  or observers in the operating room as deemed necessary by my physician or their designees. 8.   I recognize that in the event my procedure results in extended X-Ray/fluoroscopy time, I may develop a skin reaction. 9. If I have a Do Not Attempt Resuscitation (DNAR) order in place, that status will be suspended while in the operating room, procedural suite, and during the recovery period unless otherwise explicitly stated by me (or a person authorized to consent on my behalf). The surgeon or my attending physician will determine when the applicable recovery period ends for purposes of reinstating the DNAR order. 10. Patients having a sterilization procedure: I understand that if the procedure is successful the results will be permanent and it will therefore be impossible for me to inseminate, conceive, or bear children. I also understand that the procedure is intended to result in sterility, although the result has not been guaranteed. 11. I acknowledge that my physician has explained sedation/analgesia administration to me including the risk and benefits I consent to the administration of sedation/analgesia as may be necessary or desirable in the judgment of my physician.     I CERTIFY THAT I HAVE READ AND FULLY UNDERSTAND THE ABOVE CONSENT TO OPERATION and/or OTHER PROCEDURE.        ____________________________________       _________________________________      ______________________________  Signature of Patient         Signature of Responsible Person        Printed Name of Responsible Person    ____________________________________      _________________________________      ______________________________       Signature of Witness          Relationship to Patient                       Date                                       Time  Patient Name: Chivo Zamora     : 3/2/1980                 Printed: 2023      Medical Record #: [id number]                      Page 1 of 2

## (undated) NOTE — Clinical Note
Patient Name: Deni Young  YOB: 1980          MRN number:  [id number]  Date:  6/21/2017  Referring Physician:  Stacey Breaux    Progress Summary  Pt has attended 8, cancelled 0, and no shown 0 visits in Physical Therapy.    Dx: Cervical rad ER:  R 5/5, L 5/5  IR:  R 5/5, L 5/5         Flexibility:   UE/Scapular   Upper Trap: R mild hernandez; L mild hernandez  Levator Scap: R mildd hernandez; L mild hernandez       Special tests:   Alar Ligament Testing: R -, L -  Flexion-Rotation Test: -  ULTT: negative all three Modalities as needed (including heat/cold and e-stim for pain relief), HEP instruction and progression         Patient/Family/Caregiver was advised of these findings, precautions, and treatment options and has agreed to actively participate in planning and

## (undated) NOTE — LETTER
04/30/20 April 30, 2020        [address] [facility] [address]      Dear Ben Tovar:    Our records indicate that you have outstanding Lab work that was ordered for you on 3/24/2020 and has not yet been completed.    To provide yo

## (undated) NOTE — MR AVS SNAPSHOT
[address] [facility]  [address] [place] [address]  [phone]               Thank you for choosing us for your health care visit with Dana Aguilar PT.   We are glad to serve you and happy to provide you with this summ TraZODone HCl 50 MG Tabs   TAKE 1 TABLET BY MOUTH AT BEDTIME   Commonly known as:  [address] [facility]                   MyChart     Visit MyChart  You can access your MyChart to more actively manage your health care and view more details from this visit by going to